# Patient Record
Sex: MALE | Race: OTHER | HISPANIC OR LATINO | Employment: FULL TIME | URBAN - METROPOLITAN AREA
[De-identification: names, ages, dates, MRNs, and addresses within clinical notes are randomized per-mention and may not be internally consistent; named-entity substitution may affect disease eponyms.]

---

## 2020-08-22 ENCOUNTER — APPOINTMENT (EMERGENCY)
Dept: CT IMAGING | Facility: HOSPITAL | Age: 60
End: 2020-08-22
Payer: OTHER MISCELLANEOUS

## 2020-08-22 ENCOUNTER — HOSPITAL ENCOUNTER (EMERGENCY)
Facility: HOSPITAL | Age: 60
Discharge: HOME | End: 2020-08-22
Attending: EMERGENCY MEDICINE | Admitting: EMERGENCY MEDICINE
Payer: OTHER MISCELLANEOUS

## 2020-08-22 ENCOUNTER — APPOINTMENT (EMERGENCY)
Dept: RADIOLOGY | Facility: HOSPITAL | Age: 60
End: 2020-08-22
Payer: OTHER MISCELLANEOUS

## 2020-08-22 VITALS
WEIGHT: 194 LBS | HEART RATE: 68 BPM | RESPIRATION RATE: 18 BRPM | SYSTOLIC BLOOD PRESSURE: 142 MMHG | DIASTOLIC BLOOD PRESSURE: 85 MMHG | OXYGEN SATURATION: 98 % | TEMPERATURE: 97.7 F

## 2020-08-22 DIAGNOSIS — S00.83XA CONTUSION OF FACE, INITIAL ENCOUNTER: ICD-10-CM
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
## 2020-08-22 DIAGNOSIS — S09.90XA INJURY OF HEAD, INITIAL ENCOUNTER: ICD-10-CM

## 2020-08-22 DIAGNOSIS — S33.5XXA SPRAIN OF LOW BACK, INITIAL ENCOUNTER: ICD-10-CM

## 2020-08-22 DIAGNOSIS — S80.10XA MULTIPLE LEG CONTUSIONS: ICD-10-CM

## 2020-08-22 LAB
ALBUMIN SERPL BCP-MCNC: 3.9 G/DL (ref 3–5.2)
ALP SERPL-CCNC: 76 U/L (ref 43–122)
ALT SERPL W P-5'-P-CCNC: 25 U/L (ref 9–52)
ANION GAP SERPL CALCULATED.3IONS-SCNC: 5 MMOL/L (ref 5–14)
AST SERPL W P-5'-P-CCNC: 23 U/L (ref 17–59)
BASOPHILS # BLD AUTO: 0 THOUSANDS/ΜL (ref 0–0.1)
BASOPHILS NFR BLD AUTO: 1 % (ref 0–1)
BILIRUB SERPL-MCNC: 0.6 MG/DL
BILIRUB UR QL STRIP: NEGATIVE
BUN SERPL-MCNC: 21 MG/DL (ref 5–25)
CALCIUM SERPL-MCNC: 8.9 MG/DL (ref 8.4–10.2)
CHLORIDE SERPL-SCNC: 110 MMOL/L (ref 97–108)
CLARITY UR: CLEAR
CO2 SERPL-SCNC: 26 MMOL/L (ref 22–30)
COLOR UR: NORMAL
CREAT SERPL-MCNC: 1.35 MG/DL (ref 0.7–1.5)
EOSINOPHIL # BLD AUTO: 0.1 THOUSAND/ΜL (ref 0–0.4)
EOSINOPHIL NFR BLD AUTO: 2 % (ref 0–6)
ERYTHROCYTE [DISTWIDTH] IN BLOOD BY AUTOMATED COUNT: 13.8 %
GFR SERPL CREATININE-BSD FRML MDRD: 58 ML/MIN/1.73SQ M
GLUCOSE SERPL-MCNC: 105 MG/DL (ref 70–99)
GLUCOSE UR STRIP-MCNC: NEGATIVE MG/DL
HCT VFR BLD AUTO: 42.8 % (ref 41–53)
HGB BLD-MCNC: 14.6 G/DL (ref 13.5–17.5)
HGB UR QL STRIP.AUTO: NEGATIVE
KETONES UR STRIP-MCNC: NEGATIVE MG/DL
LEUKOCYTE ESTERASE UR QL STRIP: NEGATIVE
LIPASE SERPL-CCNC: 82 U/L (ref 23–300)
LYMPHOCYTES # BLD AUTO: 1.6 THOUSANDS/ΜL (ref 0.5–4)
LYMPHOCYTES NFR BLD AUTO: 31 % (ref 25–45)
MCH RBC QN AUTO: 31.5 PG (ref 26–34)
MCHC RBC AUTO-ENTMCNC: 34 G/DL (ref 31–36)
MCV RBC AUTO: 93 FL (ref 80–100)
MONOCYTES # BLD AUTO: 0.3 THOUSAND/ΜL (ref 0.2–0.9)
MONOCYTES NFR BLD AUTO: 7 % (ref 1–10)
NEUTROPHILS # BLD AUTO: 3.1 THOUSANDS/ΜL (ref 1.8–7.8)
NEUTS SEG NFR BLD AUTO: 60 % (ref 45–65)
NITRITE UR QL STRIP: NEGATIVE
PH UR STRIP.AUTO: 7 [PH]
PLATELET # BLD AUTO: 162 THOUSANDS/UL (ref 150–450)
PMV BLD AUTO: 9.5 FL (ref 8.9–12.7)
POTASSIUM SERPL-SCNC: 4.4 MMOL/L (ref 3.6–5)
PROT SERPL-MCNC: 7.3 G/DL (ref 5.9–8.4)
PROT UR STRIP-MCNC: NEGATIVE MG/DL
RBC # BLD AUTO: 4.62 MILLION/UL (ref 4.5–5.9)
SODIUM SERPL-SCNC: 141 MMOL/L (ref 137–147)
SP GR UR STRIP.AUTO: 1.01 (ref 1–1.04)
UROBILINOGEN UA: NEGATIVE MG/DL
WBC # BLD AUTO: 5.2 THOUSAND/UL (ref 4.5–11)

## 2020-08-22 PROCEDURE — 72125 CT NECK SPINE W/O DYE: CPT

## 2020-08-22 PROCEDURE — 99284 EMERGENCY DEPT VISIT MOD MDM: CPT

## 2020-08-22 PROCEDURE — 96360 HYDRATION IV INFUSION INIT: CPT

## 2020-08-22 PROCEDURE — 70450 CT HEAD/BRAIN W/O DYE: CPT

## 2020-08-22 PROCEDURE — 96361 HYDRATE IV INFUSION ADD-ON: CPT

## 2020-08-22 PROCEDURE — 85025 COMPLETE CBC W/AUTO DIFF WBC: CPT | Performed by: PHYSICIAN ASSISTANT

## 2020-08-22 PROCEDURE — 73552 X-RAY EXAM OF FEMUR 2/>: CPT

## 2020-08-22 PROCEDURE — 36415 COLL VENOUS BLD VENIPUNCTURE: CPT | Performed by: PHYSICIAN ASSISTANT

## 2020-08-22 PROCEDURE — 72100 X-RAY EXAM L-S SPINE 2/3 VWS: CPT

## 2020-08-22 PROCEDURE — 81003 URINALYSIS AUTO W/O SCOPE: CPT | Performed by: PHYSICIAN ASSISTANT

## 2020-08-22 PROCEDURE — 83690 ASSAY OF LIPASE: CPT | Performed by: PHYSICIAN ASSISTANT

## 2020-08-22 PROCEDURE — 80053 COMPREHEN METABOLIC PANEL: CPT | Performed by: PHYSICIAN ASSISTANT

## 2020-08-22 PROCEDURE — 70486 CT MAXILLOFACIAL W/O DYE: CPT

## 2020-08-22 PROCEDURE — 99284 EMERGENCY DEPT VISIT MOD MDM: CPT | Performed by: PHYSICIAN ASSISTANT

## 2020-08-22 PROCEDURE — G1004 CDSM NDSC: HCPCS

## 2020-08-22 RX ORDER — ACETAMINOPHEN 325 MG/1
650 TABLET ORAL ONCE
Status: COMPLETED | OUTPATIENT
Start: 2020-08-22 | End: 2020-08-22

## 2020-08-22 RX ORDER — ACETAMINOPHEN 325 MG/1
650 TABLET ORAL EVERY 6 HOURS PRN
Qty: 30 TABLET | Refills: 0 | Status: SHIPPED | OUTPATIENT
Start: 2020-08-22 | End: 2021-01-27 | Stop reason: ALTCHOICE

## 2020-08-22 RX ORDER — METHOCARBAMOL 500 MG/1
500 TABLET, FILM COATED ORAL 2 TIMES DAILY
Qty: 20 TABLET | Refills: 0 | Status: SHIPPED | OUTPATIENT
Start: 2020-08-22 | End: 2020-12-03 | Stop reason: ALTCHOICE

## 2020-08-22 RX ORDER — SODIUM CHLORIDE 9 MG/ML
250 INJECTION, SOLUTION INTRAVENOUS CONTINUOUS
Status: DISCONTINUED | OUTPATIENT
Start: 2020-08-22 | End: 2020-08-22 | Stop reason: HOSPADM

## 2020-08-22 RX ADMIN — ACETAMINOPHEN 650 MG: 325 TABLET ORAL at 14:58

## 2020-08-22 RX ADMIN — SODIUM CHLORIDE 250 ML/HR: 0.9 INJECTION, SOLUTION INTRAVENOUS at 12:45

## 2020-08-22 NOTE — ED PROVIDER NOTES
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  Patient complains of left sided head pain, rib pain, and leg pain  Reports the pain worsened this AM         Medical Problem   Location:  Pt with slip and fall onto floor hitting left leg back and head     Severity:  Moderate  Onset quality:  Gradual  Duration:  5 days  Timing:  Constant  Progression:  Unchanged  Chronicity:  New  Associated symptoms: no abdominal pain, no chest pain, no congestion, no cough, no diarrhea, no ear pain, no fatigue, no fever, no headaches, no loss of consciousness, no myalgias, no nausea, no rash, no rhinorrhea, no shortness of breath, no sore throat, no vomiting and no wheezing        None       History reviewed  No pertinent past medical history  History reviewed  No pertinent surgical history  History reviewed  No pertinent family history  I have reviewed and agree with the history as documented  E-Cigarette/Vaping     E-Cigarette/Vaping Substances     Social History     Tobacco Use    Smoking status: Never Smoker    Smokeless tobacco: Never Used   Substance Use Topics    Alcohol use: Not Currently    Drug use: Not Currently       Review of Systems   Constitutional: Negative  Negative for fatigue and fever  HENT: Negative  Negative for congestion, ear pain, rhinorrhea and sore throat  Eyes: Negative  Respiratory: Negative  Negative for cough, shortness of breath and wheezing  Cardiovascular: Negative  Negative for chest pain  Gastrointestinal: Negative  Negative for abdominal pain, diarrhea, nausea and vomiting  Endocrine: Negative  Genitourinary: Negative  Musculoskeletal: Negative  Negative for myalgias  Skin: Negative  Negative for rash  Allergic/Immunologic: Negative  Neurological: Negative  Negative for loss of consciousness and headaches  Hematological: Negative  Psychiatric/Behavioral: Negative      All other systems reviewed and are negative  Physical Exam  Physical Exam  Vitals signs and nursing note reviewed  Constitutional:       Appearance: Normal appearance  He is normal weight  HENT:      Head: Normocephalic and atraumatic  Comments: Left periorbiatl tendern and swelling and ecchymosis  Left jaw pain no swelling no dental pain        Right Ear: Tympanic membrane, ear canal and external ear normal       Left Ear: Tympanic membrane, ear canal and external ear normal       Nose: Nose normal       Mouth/Throat:      Mouth: Mucous membranes are moist       Pharynx: Oropharynx is clear  Eyes:      Extraocular Movements: Extraocular movements intact  Conjunctiva/sclera: Conjunctivae normal       Pupils: Pupils are equal, round, and reactive to light  Neck:      Musculoskeletal: Normal range of motion and neck supple  Comments: Midline and right paraspinal tenderness   Cardiovascular:      Rate and Rhythm: Normal rate  Pulses: Normal pulses  Pulmonary:      Effort: Pulmonary effort is normal       Breath sounds: Normal breath sounds  Abdominal:      General: Abdomen is flat  Bowel sounds are normal       Palpations: Abdomen is soft  Musculoskeletal:      Comments: Lumbar tenderness  No rib pain   Left distal thigh pain  No swelling no ecchymosis distal neuro and vascular wnl    No sciatic notch pain dtr wnl great toe ext strong bilat no paresthesias    Skin:     General: Skin is warm  Capillary Refill: Capillary refill takes less than 2 seconds  Neurological:      General: No focal deficit present  Mental Status: He is alert and oriented to person, place, and time     Psychiatric:         Mood and Affect: Mood normal          Behavior: Behavior normal          Vital Signs  ED Triage Vitals [08/22/20 1145]   Temperature Pulse Respirations Blood Pressure SpO2   97 7 °F (36 5 °C) 96 16 156/96 97 %      Temp Source Heart Rate Source Patient Position - Orthostatic VS BP Location FiO2 (%)   Tympanic Monitor Sitting Left arm --      Pain Score       --           Vitals:    08/22/20 1145 08/22/20 1442   BP: 156/96 142/85   Pulse: 96 68   Patient Position - Orthostatic VS: Sitting Sitting         Visual Acuity      ED Medications  Medications   acetaminophen (TYLENOL) tablet 650 mg (650 mg Oral Given 8/22/20 2578)       Diagnostic Studies  Results Reviewed     Procedure Component Value Units Date/Time    UA w Reflex to Microscopic w Reflex to Culture [[id number]]  (Normal) Collected:  08/22/20 1402    Lab Status:  Final result Specimen:  Urine, Clean Catch Updated:  08/22/20 1410     Color, UA Straw     Clarity, UA Clear     Specific Gravity, UA 1 015     pH, UA 7 0     Leukocytes, UA Negative     Nitrite, UA Negative     Protein, UA Negative mg/dl      Glucose, UA Negative mg/dl      Ketones, UA Negative mg/dl      Bilirubin, UA Negative     Blood, UA Negative     UROBILINOGEN UA Negative mg/dL     Lipase [[id number]]  (Normal) Collected:  08/22/20 1210    Lab Status:  Final result Specimen:  Blood from Arm, Right Updated:  08/22/20 1226     Lipase 82 u/L     Comprehensive metabolic panel [[id number]]  (Abnormal) Collected:  08/22/20 1210    Lab Status:  Final result Specimen:  Blood from Arm, Right Updated:  08/22/20 1226     Sodium 141 mmol/L      Potassium 4 4 mmol/L      Chloride 110 mmol/L      CO2 26 mmol/L      ANION GAP 5 mmol/L      BUN 21 mg/dL      Creatinine 1 35 mg/dL      Glucose 105 mg/dL      Calcium 8 9 mg/dL      AST 23 U/L      ALT 25 U/L      Alkaline Phosphatase 76 U/L      Total Protein 7 3 g/dL      Albumin 3 9 g/dL      Total Bilirubin 0 60 mg/dL      eGFR 58 ml/min/1 73sq m     Narrative:       [place] guidelines for Chronic Kidney Disease (CKD):     Stage 1 with normal or high GFR (GFR > 90 mL/min/1 73 square meters)    Stage 2 Mild CKD (GFR = 60-89 mL/min/1 73 square meters)    Stage 3A Moderate CKD (GFR = 45-59 mL/min/1 73 square meters)    Stage 3B Moderate CKD (GFR = 30-44 mL/min/1 73 square meters)    Stage 4 Severe CKD (GFR = 15-29 mL/min/1 73 square meters)    Stage 5 End Stage CKD (GFR <15 mL/min/1 73 square meters)  Note: GFR calculation is accurate only with a steady state creatinine    CBC and differential [[id number]]  (Normal) Collected:  08/22/20 1210    Lab Status:  Final result Specimen:  Blood from Arm, Right Updated:  08/22/20 1220     WBC 5 20 Thousand/uL      RBC 4 62 Million/uL      Hemoglobin 14 6 g/dL      Hematocrit 42 8 %      MCV 93 fL      MCH 31 5 pg      MCHC 34 0 g/dL      RDW 13 8 %      MPV 9 5 fL      Platelets 261 Thousands/uL      Neutrophils Relative 60 %      Lymphocytes Relative 31 %      Monocytes Relative 7 %      Eosinophils Relative 2 %      Basophils Relative 1 %      Neutrophils Absolute 3 10 Thousands/µL      Lymphocytes Absolute 1 60 Thousands/µL      Monocytes Absolute 0 30 Thousand/µL      Eosinophils Absolute 0 10 Thousand/µL      Basophils Absolute 0 00 Thousands/µL                  XR femur 2 views LEFT   Final Result by Jayson Coker MD (08/22 1336)      No acute osseous abnormality  Workstation performed: [id number]         XR spine lumbar 2 or 3 views injury   Final Result by Jayson Coker MD (08/22 1336)      No acute abnormality  Workstation performed: [id number]         CT head without contrast   Final Result by Sue Dove MD (08/22 1234)      No acute intracranial abnormality  Workstation performed: [id number]         CT facial bones without contrast   Final Result by Sue Dove MD (08/22 1237)      Normal noncontrast CT of the facial bones  Workstation performed: [id number]         CT spine cervical without contrast   Final Result by Sue Dove MD (08/22 1241)      No cervical spine fracture or traumatic malalignment  Diffuse osteopenia                     Workstation performed: [id number] Procedures  Procedures         ED Course                                             MDM      Disposition  Final diagnoses:   Fall, initial encounter   Injury of head, initial encounter   Contusion of face, initial encounter   Sprain of low back, initial encounter   Multiple leg contusions     Time reflects when diagnosis was documented in both MDM as applicable and the Disposition within this note     Time User Action Codes Description Comment    8/22/2020  2:32 PM [place]  Add [W19  AJMW] Fall, initial encounter     8/22/2020  2:32 PM [place]  Add [S09 90XA] Injury of head, initial encounter     8/22/2020  2:33 PM [place]  Add [S00 83XA] Contusion of face, initial encounter     8/22/2020  2:33 PM [place]  Add [S33  5XXA] Sprain of low back, initial encounter     8/22/2020  2:33 PM [place]  Add [S80 10XA] Multiple leg contusions       ED Disposition     ED Disposition Condition Date/Time Comment    Discharge Stable Sat Aug 22, 2020  2:32 PM Rianna Vela discharge to home/self care  Follow-up Information     Follow up With Specialties Details Why [address] Jaelyn Ware MD Family Medicine   [address] [place] [address]  [phone]            Discharge Medication List as of 8/22/2020  2:35 PM      START taking these medications    Details   acetaminophen (TYLENOL) 325 mg tablet Take 2 tablets (650 mg total) by mouth every 6 (six) hours as needed for mild pain, Starting Sat 8/22/2020, Print      methocarbamol (ROBAXIN) 500 mg tablet Take 1 tablet (500 mg total) by mouth 2 (two) times a day, Starting Sat 8/22/2020, Print           No discharge procedures on file      PDMP Review     None          ED Provider  Electronically Signed by           Luis Riojas PA-C  08/23/20 9656

## 2020-09-22 ENCOUNTER — HOSPITAL ENCOUNTER (EMERGENCY)
Facility: HOSPITAL | Age: 60
Discharge: HOME | End: 2020-09-22
Attending: EMERGENCY MEDICINE | Admitting: EMERGENCY MEDICINE
Payer: COMMERCIAL

## 2020-09-22 VITALS
WEIGHT: 191.8 LBS | RESPIRATION RATE: 16 BRPM | OXYGEN SATURATION: 99 % | TEMPERATURE: 97.7 F | SYSTOLIC BLOOD PRESSURE: 153 MMHG | HEART RATE: 79 BPM | DIASTOLIC BLOOD PRESSURE: 113 MMHG

## 2020-09-22 DIAGNOSIS — Z20.822 SUSPECTED COVID-19 VIRUS INFECTION: Primary | ICD-10-CM

## 2020-09-22 DIAGNOSIS — J02.9 PHARYNGITIS: ICD-10-CM

## 2020-09-22 PROCEDURE — 99283 EMERGENCY DEPT VISIT LOW MDM: CPT

## 2020-09-22 PROCEDURE — 99284 EMERGENCY DEPT VISIT MOD MDM: CPT | Performed by: PHYSICIAN ASSISTANT

## 2020-09-22 PROCEDURE — U0003 INFECTIOUS AGENT DETECTION BY NUCLEIC ACID (DNA OR RNA); SEVERE ACUTE RESPIRATORY SYNDROME CORONAVIRUS 2 (SARS-COV-2) (CORONAVIRUS DISEASE [COVID-19]), AMPLIFIED PROBE TECHNIQUE, MAKING USE OF HIGH THROUGHPUT TECHNOLOGIES AS DESCRIBED BY CMS-2020-01-R: HCPCS | Performed by: PHYSICIAN ASSISTANT

## 2020-09-22 RX ORDER — NAPROXEN 500 MG/1
500 TABLET ORAL 2 TIMES DAILY WITH MEALS
Qty: 20 TABLET | Refills: 0 | Status: SHIPPED | OUTPATIENT
Start: 2020-09-22 | End: 2020-12-03 | Stop reason: ALTCHOICE

## 2020-09-22 NOTE — ED PROVIDER NOTES
History  Chief Complaint   Patient presents with    Sore Throat     fever and sore throat since Saturday     Patient is a 51-year-old male presents today for evaluation of a sore throat patient reports he was told at work and high temperature and was sent for evaluation and cover testing  Patient denies any coughing, chest pain, shortness of breath, abdominal pain, nausea, vomiting, diarrhea  Patient reports he is not aware of any COVID-19 positive contacts however is reported that he needs testing  Patient reports the throat is sore in quality, does not radiate, is mild in severity patient denies taking any medications to alleviate his symptoms  Patient denies any difficulty breathing, difficulty managing oral secretions patient denies any neck stiffness or difficulty turning his head or neck  Prior to Admission Medications   Prescriptions Last Dose Informant Patient Reported? Taking?   acetaminophen (TYLENOL) 325 mg tablet   No No   Sig: Take 2 tablets (650 mg total) by mouth every 6 (six) hours as needed for mild pain   methocarbamol (ROBAXIN) 500 mg tablet   No No   Sig: Take 1 tablet (500 mg total) by mouth 2 (two) times a day      Facility-Administered Medications: None       History reviewed  No pertinent past medical history  Past Surgical History:   Procedure Laterality Date    HERNIA REPAIR         History reviewed  No pertinent family history  I have reviewed and agree with the history as documented  E-Cigarette/Vaping    E-Cigarette Use Never User      E-Cigarette/Vaping Substances     Social History     Tobacco Use    Smoking status: Never Smoker    Smokeless tobacco: Never Used   Substance Use Topics    Alcohol use: Not Currently    Drug use: Not Currently       Review of Systems   Constitutional: Negative for chills, fatigue and fever  HENT: Positive for sore throat  Negative for congestion, ear pain and rhinorrhea  Eyes: Negative for redness     Respiratory: Negative for chest tightness and shortness of breath  Cardiovascular: Negative for chest pain and palpitations  Gastrointestinal: Negative for abdominal pain, nausea and vomiting  Genitourinary: Negative for dysuria and hematuria  Musculoskeletal: Negative  Skin: Negative for rash  Neurological: Negative for dizziness, syncope, light-headedness and numbness  Physical Exam  Physical Exam  Vitals signs and nursing note reviewed  Constitutional:       Appearance: He is well-developed  HENT:      Head: Normocephalic and atraumatic  Mouth/Throat:      Pharynx: Uvula midline  No pharyngeal swelling or uvula swelling  Tonsils: No tonsillar exudate or tonsillar abscesses  Eyes:      Pupils: Pupils are equal, round, and reactive to light  Neck:      Musculoskeletal: Normal range of motion  Cardiovascular:      Rate and Rhythm: Normal rate and regular rhythm  Heart sounds: Normal heart sounds  Pulmonary:      Effort: Pulmonary effort is normal       Breath sounds: Normal breath sounds  Abdominal:      Palpations: Abdomen is soft  Tenderness: There is no abdominal tenderness  There is no guarding  Lymphadenopathy:      Cervical: No cervical adenopathy  Skin:     General: Skin is warm and dry  Capillary Refill: Capillary refill takes less than 2 seconds  Neurological:      Mental Status: He is alert and oriented to person, place, and time           Vital Signs  ED Triage Vitals [09/22/20 1156]   Temperature Pulse Respirations Blood Pressure SpO2   97 7 °F (36 5 °C) 79 16 (!) 153/113 99 %      Temp Source Heart Rate Source Patient Position - Orthostatic VS BP Location FiO2 (%)   Tympanic Monitor Sitting Left arm --      Pain Score       2           Vitals:    09/22/20 1156   BP: (!) 153/113   Pulse: 79   Patient Position - Orthostatic VS: Sitting         Visual Acuity      ED Medications  Medications - No data to display    Diagnostic Studies  Results Reviewed     Procedure Component Value Units Date/Time    Novel Coronavirus (COVID-19), PCR [facility] [[id number]] Collected:  09/22/20 1215    Lab Status:  No result Specimen:  Nares from Nose                  No orders to display              Procedures  Procedures         ED Course                           SBIRT 22yo+      Most Recent Value   SBIRT (25 yo +)   In order to provide better care to our patients, we are screening all of our patients for alcohol and drug use  Would it be okay to ask you these screening questions? Yes Filed at: 09/22/2020 1201   Initial Alcohol Screen: US AUDIT-C    1  How often do you have a drink containing alcohol?  0 Filed at: 09/22/2020 1201   2  How many drinks containing alcohol do you have on a typical day you are drinking? 0 Filed at: 09/22/2020 1201   3a  Male UNDER 65: How often do you have five or more drinks on one occasion? 0 Filed at: 09/22/2020 1201   3b  FEMALE Any Age, or MALE 65+: How often do you have 4 or more drinks on one occassion? 0 Filed at: 09/22/2020 1201   Audit-C Score  0 Filed at: 09/22/2020 1201   IGOR: How many times in the past year have you    Used an illegal drug or used a prescription medication for non-medical reasons? Never Filed at: 09/22/2020 1201                  MDM  Number of Diagnoses or Management Options  Pharyngitis:   Suspected Covid-19 Virus Infection:   Diagnosis management comments: Strict return to ED precautions discussed  Patient recommended to follow up promptly with appropriate outpatient provider  Patient and/or family members verbalizes understanding and agrees with plan  Patient is stable for discharge      Portions of the record may have been created with voice recognition software  Occasional wrong word or "sound a like" substitutions may have occurred due to the inherent limitations of voice recognition software  Read the chart carefully and recognize, using context, where substitutions have occurred          Disposition  Final diagnoses: Suspected Covid-19 Virus Infection   Pharyngitis     Time reflects when diagnosis was documented in both MDM as applicable and the Disposition within this note     Time User Action Codes Description Comment    9/22/2020 12:09 PM Vilma Gr [Z20 [id number]] Suspected Covid-19 Virus Infection     9/22/2020 12:10 PM Sharda Zaman - Margaret Maria Ines Saldivar [J02 [id number]] Pharyngitis       ED Disposition     ED Disposition Condition Date/Time Comment    Discharge Good Tue Sep 22, 2020 12:09 PM Rianna Vela discharge to home/self care  Follow-up Information     Follow up With Specialties Details Why Contact Info    Georgi Lewis MD Family Medicine Schedule an appointment as soon as possible for a visit   [address] [place] [address]  [phone]            Discharge Medication List as of 9/22/2020 12:11 PM      START taking these medications    Details   menthol-cetylpyridinium (CEPACOL) 3 MG lozenge Take 1 lozenge (3 mg total) by mouth as needed for sore throat, Starting Tue 9/22/2020, Normal      naproxen (EC NAPROSYN) 500 MG EC tablet Take 1 tablet (500 mg total) by mouth 2 (two) times a day with meals, Starting Tue 9/22/2020, Until Wed 9/22/2021, Normal         CONTINUE these medications which have NOT CHANGED    Details   acetaminophen (TYLENOL) 325 mg tablet Take 2 tablets (650 mg total) by mouth every 6 (six) hours as needed for mild pain, Starting Sat 8/22/2020, Print      methocarbamol (ROBAXIN) 500 mg tablet Take 1 tablet (500 mg total) by mouth 2 (two) times a day, Starting Sat 8/22/2020, Print           No discharge procedures on file      PDMP Review     None          ED Provider  Electronically Signed by           Belvin Hashimoto, PA-C  09/22/20 1794

## 2020-09-23 LAB — SARS-COV-2 RNA SPEC QL NAA+PROBE: DETECTED

## 2020-09-26 NOTE — ED NOTES
Provider Citlalli Llanos PA-C informed of patient's positive Covid result  (09/26/2020 @1217)       [place]  09/26/20 1218

## 2020-12-03 ENCOUNTER — OFFICE VISIT (OUTPATIENT)
Dept: FAMILY MEDICINE CLINIC | Facility: CLINIC | Age: 60
End: 2020-12-03
Payer: COMMERCIAL

## 2020-12-03 VITALS
DIASTOLIC BLOOD PRESSURE: 110 MMHG | TEMPERATURE: 98.1 F | RESPIRATION RATE: 18 BRPM | HEIGHT: 68 IN | OXYGEN SATURATION: 98 % | SYSTOLIC BLOOD PRESSURE: 150 MMHG | HEART RATE: 72 BPM | WEIGHT: 196 LBS | BODY MASS INDEX: 29.7 KG/M2

## 2020-12-03 DIAGNOSIS — R53.83 OTHER FATIGUE: ICD-10-CM

## 2020-12-03 DIAGNOSIS — Z12.11 ENCOUNTER FOR SCREENING COLONOSCOPY: ICD-10-CM

## 2020-12-03 DIAGNOSIS — I10 ESSENTIAL HYPERTENSION: Primary | ICD-10-CM

## 2020-12-03 DIAGNOSIS — R73.9 HYPERGLYCEMIA: ICD-10-CM

## 2020-12-03 DIAGNOSIS — Z23 NEEDS FLU SHOT: ICD-10-CM

## 2020-12-03 DIAGNOSIS — E78.2 MIXED HYPERLIPIDEMIA: ICD-10-CM

## 2020-12-03 PROCEDURE — 90471 IMMUNIZATION ADMIN: CPT

## 2020-12-03 PROCEDURE — 99204 OFFICE O/P NEW MOD 45 MIN: CPT | Performed by: NURSE PRACTITIONER

## 2020-12-03 PROCEDURE — 90682 RIV4 VACC RECOMBINANT DNA IM: CPT

## 2020-12-03 RX ORDER — LISINOPRIL AND HYDROCHLOROTHIAZIDE 20; 12.5 MG/1; MG/1
1 TABLET ORAL DAILY
Qty: 30 TABLET | Refills: 5 | Status: SHIPPED | OUTPATIENT
Start: 2020-12-03 | End: 2021-01-27 | Stop reason: SDUPTHER

## 2020-12-22 ENCOUNTER — TELEPHONE (OUTPATIENT)
Dept: FAMILY MEDICINE CLINIC | Facility: CLINIC | Age: 60
End: 2020-12-22

## 2021-01-14 ENCOUNTER — LAB (OUTPATIENT)
Dept: LAB | Facility: HOSPITAL | Age: 61
End: 2021-01-14
Payer: COMMERCIAL

## 2021-01-14 DIAGNOSIS — E78.2 MIXED HYPERLIPIDEMIA: ICD-10-CM

## 2021-01-14 DIAGNOSIS — R73.9 HYPERGLYCEMIA: ICD-10-CM

## 2021-01-14 DIAGNOSIS — R53.83 OTHER FATIGUE: ICD-10-CM

## 2021-01-14 LAB
ALBUMIN SERPL BCP-MCNC: 4.4 G/DL (ref 3–5.2)
ALP SERPL-CCNC: 79 U/L (ref 43–122)
ALT SERPL W P-5'-P-CCNC: 20 U/L (ref 9–52)
ANION GAP SERPL CALCULATED.3IONS-SCNC: 7 MMOL/L (ref 5–14)
AST SERPL W P-5'-P-CCNC: 29 U/L (ref 17–59)
BASOPHILS # BLD AUTO: 0 THOUSANDS/ΜL (ref 0–0.1)
BASOPHILS NFR BLD AUTO: 1 % (ref 0–1)
BILIRUB SERPL-MCNC: 1.4 MG/DL
BUN SERPL-MCNC: 17 MG/DL (ref 5–25)
CALCIUM SERPL-MCNC: 9.4 MG/DL (ref 8.4–10.2)
CHLORIDE SERPL-SCNC: 105 MMOL/L (ref 97–108)
CHOLEST SERPL-MCNC: 170 MG/DL
CO2 SERPL-SCNC: 29 MMOL/L (ref 22–30)
CREAT SERPL-MCNC: 1.48 MG/DL (ref 0.7–1.5)
EOSINOPHIL # BLD AUTO: 0.2 THOUSAND/ΜL (ref 0–0.4)
EOSINOPHIL NFR BLD AUTO: 3 % (ref 0–6)
ERYTHROCYTE [DISTWIDTH] IN BLOOD BY AUTOMATED COUNT: 14.3 %
EST. AVERAGE GLUCOSE BLD GHB EST-MCNC: 117 MG/DL
GFR SERPL CREATININE-BSD FRML MDRD: 51 ML/MIN/1.73SQ M
GLUCOSE P FAST SERPL-MCNC: 96 MG/DL (ref 70–99)
HBA1C MFR BLD: 5.7 %
HCT VFR BLD AUTO: 44.6 % (ref 41–53)
HDLC SERPL-MCNC: 27 MG/DL
HGB BLD-MCNC: 14.6 G/DL (ref 13.5–17.5)
LDLC SERPL CALC-MCNC: 114 MG/DL
LYMPHOCYTES # BLD AUTO: 2.1 THOUSANDS/ΜL (ref 0.5–4)
LYMPHOCYTES NFR BLD AUTO: 37 % (ref 25–45)
MCH RBC QN AUTO: 30.6 PG (ref 26–34)
MCHC RBC AUTO-ENTMCNC: 32.7 G/DL (ref 31–36)
MCV RBC AUTO: 94 FL (ref 80–100)
MONOCYTES # BLD AUTO: 0.5 THOUSAND/ΜL (ref 0.2–0.9)
MONOCYTES NFR BLD AUTO: 8 % (ref 1–10)
NEUTROPHILS # BLD AUTO: 3 THOUSANDS/ΜL (ref 1.8–7.8)
NEUTS SEG NFR BLD AUTO: 52 % (ref 45–65)
NONHDLC SERPL-MCNC: 143 MG/DL
PLATELET # BLD AUTO: 181 THOUSANDS/UL (ref 150–450)
PMV BLD AUTO: 9.9 FL (ref 8.9–12.7)
POTASSIUM SERPL-SCNC: 4 MMOL/L (ref 3.6–5)
PROT SERPL-MCNC: 8 G/DL (ref 5.9–8.4)
RBC # BLD AUTO: 4.76 MILLION/UL (ref 4.5–5.9)
SODIUM SERPL-SCNC: 141 MMOL/L (ref 137–147)
TRIGL SERPL-MCNC: 147 MG/DL
TSH SERPL DL<=0.05 MIU/L-ACNC: 1.19 UIU/ML (ref 0.47–4.68)
WBC # BLD AUTO: 5.8 THOUSAND/UL (ref 4.5–11)

## 2021-01-14 PROCEDURE — 80053 COMPREHEN METABOLIC PANEL: CPT

## 2021-01-14 PROCEDURE — 83036 HEMOGLOBIN GLYCOSYLATED A1C: CPT

## 2021-01-14 PROCEDURE — 36415 COLL VENOUS BLD VENIPUNCTURE: CPT

## 2021-01-14 PROCEDURE — 84443 ASSAY THYROID STIM HORMONE: CPT

## 2021-01-14 PROCEDURE — 80061 LIPID PANEL: CPT

## 2021-01-14 PROCEDURE — 85025 COMPLETE CBC W/AUTO DIFF WBC: CPT

## 2021-01-15 ENCOUNTER — TELEPHONE (OUTPATIENT)
Dept: FAMILY MEDICINE CLINIC | Facility: CLINIC | Age: 61
End: 2021-01-15

## 2021-01-15 NOTE — TELEPHONE ENCOUNTER
Call LVM need to call office back for results  ----- Message from Esme Goncalves sent at 1/15/2021  3:46 PM EST -----  Please call pt and inform that labs were normal except that he is pre-diabetes and not yet diabetic but he should eat foods higher in fiber such as vegetables, fruits, whole grains and foods rich in omega-3 fatty acids such as fatty fish, nuts, and seeds but avoid consumption of sugar-sweetened beverages  Diet and exercise with weight loss as well     Explain to patient that we would monitor in 6 mo their A1c again and if normal will monitor yearly  To follow up in 6 months

## 2021-01-27 ENCOUNTER — OFFICE VISIT (OUTPATIENT)
Dept: FAMILY MEDICINE CLINIC | Facility: CLINIC | Age: 61
End: 2021-01-27
Payer: COMMERCIAL

## 2021-01-27 VITALS
HEART RATE: 78 BPM | DIASTOLIC BLOOD PRESSURE: 76 MMHG | HEIGHT: 68 IN | SYSTOLIC BLOOD PRESSURE: 120 MMHG | OXYGEN SATURATION: 98 % | WEIGHT: 192 LBS | BODY MASS INDEX: 29.1 KG/M2 | RESPIRATION RATE: 16 BRPM | TEMPERATURE: 98 F

## 2021-01-27 DIAGNOSIS — Z12.5 SCREENING FOR PROSTATE CANCER: ICD-10-CM

## 2021-01-27 DIAGNOSIS — N18.31 STAGE 3A CHRONIC KIDNEY DISEASE (HCC): ICD-10-CM

## 2021-01-27 DIAGNOSIS — N52.9 ERECTILE DYSFUNCTION, UNSPECIFIED ERECTILE DYSFUNCTION TYPE: ICD-10-CM

## 2021-01-27 DIAGNOSIS — I10 ESSENTIAL HYPERTENSION: ICD-10-CM

## 2021-01-27 DIAGNOSIS — Z11.4 SCREENING FOR HUMAN IMMUNODEFICIENCY VIRUS: ICD-10-CM

## 2021-01-27 DIAGNOSIS — K40.90 DIRECT INGUINAL HERNIA: ICD-10-CM

## 2021-01-27 DIAGNOSIS — Z12.11 COLON CANCER SCREENING: ICD-10-CM

## 2021-01-27 DIAGNOSIS — Z11.59 NEED FOR HEPATITIS C SCREENING TEST: ICD-10-CM

## 2021-01-27 DIAGNOSIS — Z00.01 ENCOUNTER FOR GENERAL ADULT MEDICAL EXAMINATION WITH ABNORMAL FINDINGS: Primary | ICD-10-CM

## 2021-01-27 PROCEDURE — 99396 PREV VISIT EST AGE 40-64: CPT | Performed by: FAMILY MEDICINE

## 2021-01-27 RX ORDER — SILDENAFIL CITRATE 20 MG/1
40 TABLET ORAL DAILY
Qty: 60 TABLET | Refills: 3 | Status: SHIPPED | OUTPATIENT
Start: 2021-01-27 | End: 2022-02-11

## 2021-01-27 RX ORDER — ATORVASTATIN CALCIUM 20 MG/1
20 TABLET, FILM COATED ORAL DAILY
Qty: 90 TABLET | Refills: 3 | Status: SHIPPED | OUTPATIENT
Start: 2021-01-27 | End: 2021-05-04 | Stop reason: SDUPTHER

## 2021-01-27 RX ORDER — LISINOPRIL AND HYDROCHLOROTHIAZIDE 20; 12.5 MG/1; MG/1
1 TABLET ORAL DAILY
Qty: 90 TABLET | Refills: 3 | Status: SHIPPED | OUTPATIENT
Start: 2021-01-27 | End: 2021-04-20

## 2021-01-27 NOTE — PROGRESS NOTES
Assessment/Plan:    No problem-specific Assessment & Plan notes found for this encounter  Diagnoses and all orders for this visit:    Encounter for general adult medical examination with abnormal findings    Need for hepatitis C screening test  -     Hepatitis C antibody; Future    Screening for human immunodeficiency virus  -     HIV 1/2 Antigen/Antibody (4th Generation) w Reflex [facility]; Future    Essential hypertension  -     lisinopril-hydrochlorothiazide (PRINZIDE,ZESTORETIC) 20-12 5 MG per tablet; Take 1 tablet by mouth daily  -     atorvastatin (LIPITOR) 20 mg tablet; Take 1 tablet (20 mg total) by mouth daily    Erectile dysfunction, unspecified erectile dysfunction type  -     sildenafil (REVATIO) 20 mg tablet; Take 2 tablets (40 mg total) by mouth daily    Stage 3a chronic kidney disease  -     US kidney and bladder; Future  -     Urinalysis with microscopic    Screening for prostate cancer  -     PSA, total and free; Future    Direct inguinal hernia    Colon cancer screening  -     Cologuard; Future          Subjective:      Patient ID: Amaya Ho is a 64 y o  male  HPI  Patient is here for PE, not having any acute distress  New patient  Patient reports 'urine infection in the past"  Seen in the past by Dr Corona Foil  He did not follow with her due to misscommunication with her  #years ago he found himself at the ER after sore throat then hematuria and high blood pressure  He feels good and believes he has no more issues with his kidneys  The following portions of the patient's history were reviewed and updated as appropriate: allergies, current medications, past family history, past medical history, past social history, past surgical history and problem list     Review of Systems   Constitutional: Negative for diaphoresis, fatigue, fever and unexpected weight change  Respiratory: Negative for apnea, cough, choking, chest tightness and shortness of breath      Cardiovascular: Negative for chest pain, palpitations and leg swelling  Gastrointestinal: Negative for abdominal distention, abdominal pain, anal bleeding, blood in stool and constipation  Musculoskeletal: Negative for arthralgias, back pain, gait problem and joint swelling  Neurological: Negative for dizziness, facial asymmetry, light-headedness and headaches  Psychiatric/Behavioral: Negative for behavioral problems, dysphoric mood and self-injury  The patient is not nervous/anxious  Objective:      /76 (BP Location: Left arm, Patient Position: Sitting, Cuff Size: Standard)   Pulse 78   Temp 98 °F (36 7 °C) (Temporal)   Resp 16   Ht 5' 8" (1 727 m)   Wt 87 1 kg (192 lb)   SpO2 98%   BMI 29 19 kg/m²          Physical Exam  Vitals signs and nursing note reviewed  Neck:      Musculoskeletal: No edema or neck rigidity  Thyroid: No thyroid mass or thyromegaly  Vascular: No carotid bruit or JVD  Trachea: No tracheal tenderness  Cardiovascular:      Rate and Rhythm: Normal rate and regular rhythm  No extrasystoles are present  Pulses: Normal pulses  Heart sounds: Normal heart sounds  Heart sounds not distant  No friction rub  Pulmonary:      Effort: Pulmonary effort is normal  No tachypnea or bradypnea  Breath sounds: Normal breath sounds  No stridor  Abdominal:      General: Bowel sounds are normal  There is no abdominal bruit  Palpations: Abdomen is soft  There is no hepatomegaly or splenomegaly  Hernia: No hernia is present  Musculoskeletal: Normal range of motion  Skin:     General: Skin is warm and dry  Neurological:      Mental Status: He is oriented to person, place, and time  Deep Tendon Reflexes: Reflexes are normal and symmetric  Psychiatric:         Behavior: Behavior normal          Thought Content:  Thought content normal          Judgment: Judgment normal

## 2021-01-30 ENCOUNTER — LAB (OUTPATIENT)
Dept: LAB | Facility: HOSPITAL | Age: 61
End: 2021-01-30
Payer: COMMERCIAL

## 2021-01-30 ENCOUNTER — HOSPITAL ENCOUNTER (OUTPATIENT)
Dept: ULTRASOUND IMAGING | Facility: HOSPITAL | Age: 61
Discharge: HOME | End: 2021-01-30
Payer: COMMERCIAL

## 2021-01-30 DIAGNOSIS — N18.31 STAGE 3A CHRONIC KIDNEY DISEASE (HCC): ICD-10-CM

## 2021-01-30 DIAGNOSIS — Z11.4 SCREENING FOR HUMAN IMMUNODEFICIENCY VIRUS: ICD-10-CM

## 2021-01-30 DIAGNOSIS — Z11.59 NEED FOR HEPATITIS C SCREENING TEST: ICD-10-CM

## 2021-01-30 DIAGNOSIS — Z12.5 SCREENING FOR PROSTATE CANCER: ICD-10-CM

## 2021-01-30 LAB
BACTERIA UR QL AUTO: ABNORMAL /HPF
BILIRUB UR QL STRIP: NEGATIVE
CLARITY UR: CLEAR
COLOR UR: ABNORMAL
GLUCOSE UR STRIP-MCNC: NEGATIVE MG/DL
HGB UR QL STRIP.AUTO: NEGATIVE
KETONES UR STRIP-MCNC: NEGATIVE MG/DL
LEUKOCYTE ESTERASE UR QL STRIP: NEGATIVE
MUCOUS THREADS UR QL AUTO: ABNORMAL
NITRITE UR QL STRIP: NEGATIVE
NON-SQ EPI CELLS URNS QL MICRO: ABNORMAL /HPF
PH UR STRIP.AUTO: 6 [PH]
PROT UR STRIP-MCNC: NEGATIVE MG/DL
RBC #/AREA URNS AUTO: ABNORMAL /HPF
SP GR UR STRIP.AUTO: 1.01 (ref 1–1.04)
UROBILINOGEN UA: NEGATIVE MG/DL
WBC #/AREA URNS AUTO: ABNORMAL /HPF

## 2021-01-30 PROCEDURE — 36415 COLL VENOUS BLD VENIPUNCTURE: CPT

## 2021-01-30 PROCEDURE — 86803 HEPATITIS C AB TEST: CPT

## 2021-01-30 PROCEDURE — 76770 US EXAM ABDO BACK WALL COMP: CPT

## 2021-01-30 PROCEDURE — 84154 ASSAY OF PSA FREE: CPT

## 2021-01-30 PROCEDURE — 87389 HIV-1 AG W/HIV-1&-2 AB AG IA: CPT

## 2021-01-30 PROCEDURE — 84153 ASSAY OF PSA TOTAL: CPT

## 2021-01-30 PROCEDURE — 81001 URINALYSIS AUTO W/SCOPE: CPT | Performed by: FAMILY MEDICINE

## 2021-01-31 LAB — HCV AB SER QL: NORMAL

## 2021-02-02 LAB — HIV 1+2 AB+HIV1 P24 AG SERPL QL IA: NORMAL

## 2021-02-03 LAB
PSA FREE MFR SERPL: 26.7 %
PSA FREE SERPL-MCNC: 0.56 NG/ML
PSA SERPL-MCNC: 2.1 NG/ML (ref 0–4)

## 2021-02-08 NOTE — RESULT ENCOUNTER NOTE
Please call patient's labs are in normal limits  The ultrasound of the kidneys report that non obstructing kidney stones and enlarged prostate  No treatment is needed at this time for kidney stones  I will see patient again in 3 months to follow up hypertension

## 2021-03-27 ENCOUNTER — HOSPITAL ENCOUNTER (EMERGENCY)
Facility: HOSPITAL | Age: 61
Discharge: HOME | End: 2021-03-27
Attending: EMERGENCY MEDICINE
Payer: COMMERCIAL

## 2021-03-27 VITALS
TEMPERATURE: 98.1 F | OXYGEN SATURATION: 100 % | WEIGHT: 196.21 LBS | SYSTOLIC BLOOD PRESSURE: 131 MMHG | HEART RATE: 82 BPM | RESPIRATION RATE: 18 BRPM | DIASTOLIC BLOOD PRESSURE: 85 MMHG | BODY MASS INDEX: 29.83 KG/M2

## 2021-03-27 DIAGNOSIS — R05.9 COUGH: Primary | ICD-10-CM

## 2021-03-27 DIAGNOSIS — J30.9 ALLERGIC RHINITIS: ICD-10-CM

## 2021-03-27 PROCEDURE — 99282 EMERGENCY DEPT VISIT SF MDM: CPT | Performed by: EMERGENCY MEDICINE

## 2021-03-27 PROCEDURE — 99283 EMERGENCY DEPT VISIT LOW MDM: CPT

## 2021-03-27 RX ORDER — LORATADINE 10 MG/1
10 TABLET ORAL DAILY
Qty: 20 TABLET | Refills: 0 | Status: SHIPPED | OUTPATIENT
Start: 2021-03-27 | End: 2022-02-25 | Stop reason: ALTCHOICE

## 2021-03-27 RX ORDER — FLUTICASONE PROPIONATE 50 MCG
1 SPRAY, SUSPENSION (ML) NASAL DAILY
Qty: 16 G | Refills: 0 | Status: SHIPPED | OUTPATIENT
Start: 2021-03-27 | End: 2022-02-25 | Stop reason: ALTCHOICE

## 2021-04-18 DIAGNOSIS — I10 ESSENTIAL HYPERTENSION: ICD-10-CM

## 2021-04-20 RX ORDER — LISINOPRIL AND HYDROCHLOROTHIAZIDE 20; 12.5 MG/1; MG/1
TABLET ORAL
Qty: 30 TABLET | Refills: 5 | Status: SHIPPED | OUTPATIENT
Start: 2021-04-20 | End: 2021-06-10

## 2021-04-28 ENCOUNTER — OFFICE VISIT (OUTPATIENT)
Dept: FAMILY MEDICINE CLINIC | Facility: CLINIC | Age: 61
End: 2021-04-28
Payer: COMMERCIAL

## 2021-04-28 VITALS
HEART RATE: 96 BPM | HEIGHT: 68 IN | WEIGHT: 198 LBS | DIASTOLIC BLOOD PRESSURE: 70 MMHG | SYSTOLIC BLOOD PRESSURE: 130 MMHG | OXYGEN SATURATION: 97 % | RESPIRATION RATE: 16 BRPM | BODY MASS INDEX: 30.01 KG/M2 | TEMPERATURE: 98 F

## 2021-04-28 DIAGNOSIS — I10 ESSENTIAL HYPERTENSION: Primary | ICD-10-CM

## 2021-04-28 DIAGNOSIS — N18.31 STAGE 3A CHRONIC KIDNEY DISEASE (HCC): ICD-10-CM

## 2021-04-28 PROCEDURE — 99214 OFFICE O/P EST MOD 30 MIN: CPT | Performed by: FAMILY MEDICINE

## 2021-04-28 NOTE — PROGRESS NOTES
Assessment/Plan:  I reviewed previous labs in discussed with patient  Blood pressure well control continue same treatment  His kidney function has been decrease and possible related to the use of lisinopril  The nuclear decrease is not in of to stop medication  We will check kidney ultrasound to rule out secondary causes  No problem-specific Assessment & Plan notes found for this encounter  Diagnoses and all orders for this visit:    Essential hypertension    Stage 3a chronic kidney disease ([facility] [address] )  -     US kidney and bladder; Future  -     Basic metabolic panel; Future          Subjective:      Patient ID: Bryon Godoy is a 64 y o  male  Patient is here to follow HTN, patient states good compliance with treatment  Denies chest pain, shortness of breath, urinary problems  No exercise but follows low salt diet  Current medication includes: ACE inhibitor agents  Lab Results       Component                Value               Date/Time                  HGBA1C                   5 7 (H)             01/14/2021 10:15 AM        EGFR                     55 (L)              04/29/2021 03:34 PM        LDLCALC                  114                 01/14/2021 10:15 AM   atorvastatin  fluticasone  lisinopril-hydrochlorothiazide  loratadine  sildenafil        The following portions of the patient's history were reviewed and updated as appropriate: allergies, current medications, past family history, past medical history, past social history, past surgical history and problem list     Review of Systems   Constitutional: Negative for diaphoresis, fatigue, fever and unexpected weight change  Respiratory: Negative for apnea, cough, choking, chest tightness and shortness of breath  Cardiovascular: Negative for chest pain, palpitations and leg swelling  Gastrointestinal: Negative for abdominal distention, abdominal pain, anal bleeding, blood in stool and constipation     Genitourinary:        He continues having difficulty with erectile dysfunction and previous dose of she does admit to works for him  He wants refills  Musculoskeletal: Negative for arthralgias, back pain, gait problem and joint swelling  Neurological: Negative for dizziness, facial asymmetry, light-headedness and headaches  Psychiatric/Behavioral: Negative for behavioral problems, dysphoric mood and self-injury  The patient is not nervous/anxious  Objective:      /70 (BP Location: Left arm, Patient Position: Sitting, Cuff Size: Standard)   Pulse 96   Temp 98 °F (36 7 °C) (Temporal)   Resp 16   Ht 5' 8" (1 727 m)   Wt 89 8 kg (198 lb)   SpO2 97%   BMI 30 11 kg/m²          Physical Exam  Vitals signs and nursing note reviewed  Neck:      Musculoskeletal: No edema or neck rigidity  Thyroid: No thyroid mass or thyromegaly  Vascular: No carotid bruit or JVD  Trachea: No tracheal tenderness  Cardiovascular:      Rate and Rhythm: Normal rate and regular rhythm  No extrasystoles are present  Pulses: Normal pulses  Heart sounds: Normal heart sounds  Heart sounds not distant  No friction rub  Pulmonary:      Effort: Pulmonary effort is normal  No tachypnea or bradypnea  Breath sounds: Normal breath sounds  No stridor  Abdominal:      General: Bowel sounds are normal  There is no abdominal bruit  Palpations: Abdomen is soft  There is no hepatomegaly or splenomegaly  Hernia: No hernia is present  Musculoskeletal: Normal range of motion  Skin:     General: Skin is warm and dry  Neurological:      Mental Status: He is oriented to person, place, and time  Deep Tendon Reflexes: Reflexes are normal and symmetric  Psychiatric:         Behavior: Behavior normal          Thought Content:  Thought content normal          Judgment: Judgment normal

## 2021-04-29 ENCOUNTER — LAB (OUTPATIENT)
Dept: LAB | Facility: HOSPITAL | Age: 61
End: 2021-04-29
Payer: COMMERCIAL

## 2021-04-29 DIAGNOSIS — N18.31 STAGE 3A CHRONIC KIDNEY DISEASE (HCC): ICD-10-CM

## 2021-04-29 LAB
ANION GAP SERPL CALCULATED.3IONS-SCNC: 7 MMOL/L (ref 5–14)
BUN SERPL-MCNC: 19 MG/DL (ref 5–25)
CALCIUM SERPL-MCNC: 9 MG/DL (ref 8.4–10.2)
CHLORIDE SERPL-SCNC: 110 MMOL/L (ref 97–108)
CO2 SERPL-SCNC: 27 MMOL/L (ref 22–30)
CREAT SERPL-MCNC: 1.41 MG/DL (ref 0.7–1.5)
GFR SERPL CREATININE-BSD FRML MDRD: 55 ML/MIN/1.73SQ M
GLUCOSE P FAST SERPL-MCNC: 76 MG/DL (ref 70–99)
POTASSIUM SERPL-SCNC: 4 MMOL/L (ref 3.6–5)
SODIUM SERPL-SCNC: 144 MMOL/L (ref 137–147)

## 2021-04-29 PROCEDURE — 36415 COLL VENOUS BLD VENIPUNCTURE: CPT

## 2021-04-29 PROCEDURE — 80048 BASIC METABOLIC PNL TOTAL CA: CPT

## 2021-05-04 ENCOUNTER — HOSPITAL ENCOUNTER (OUTPATIENT)
Dept: ULTRASOUND IMAGING | Facility: HOSPITAL | Age: 61
Discharge: HOME | End: 2021-05-04
Payer: COMMERCIAL

## 2021-05-04 DIAGNOSIS — I10 ESSENTIAL HYPERTENSION: ICD-10-CM

## 2021-05-04 DIAGNOSIS — N18.31 STAGE 3A CHRONIC KIDNEY DISEASE (HCC): ICD-10-CM

## 2021-05-04 PROCEDURE — 76770 US EXAM ABDO BACK WALL COMP: CPT

## 2021-05-04 RX ORDER — ATORVASTATIN CALCIUM 20 MG/1
20 TABLET, FILM COATED ORAL DAILY
Qty: 90 TABLET | Refills: 3 | Status: SHIPPED | OUTPATIENT
Start: 2021-05-04 | End: 2022-02-28

## 2021-05-04 NOTE — RESULT ENCOUNTER NOTE
Please call patient, there is a slight improvement in the kidney function  please keep same treatment

## 2021-05-14 DIAGNOSIS — N18.31 STAGE 3A CHRONIC KIDNEY DISEASE (HCC): Primary | ICD-10-CM

## 2021-05-14 NOTE — RESULT ENCOUNTER NOTE
Please call patient:  The ultrasound is showing same changes from January  He seems having a permanent kidney damage  He needs to follow up with kidney specialist   Referral in the system

## 2021-05-18 ENCOUNTER — TELEPHONE (OUTPATIENT)
Dept: FAMILY MEDICINE CLINIC | Facility: CLINIC | Age: 61
End: 2021-05-18

## 2021-05-18 NOTE — TELEPHONE ENCOUNTER
Patient came to the office stating that he was supposed to have a medication for his cholesterol sent to the Pharmacy

## 2021-05-27 ENCOUNTER — TELEPHONE (OUTPATIENT)
Dept: FAMILY MEDICINE CLINIC | Facility: CLINIC | Age: 61
End: 2021-05-27

## 2021-05-27 NOTE — TELEPHONE ENCOUNTER
----- Message from Natalie Lopez MD sent at 5/14/2021 10:17 AM EDT -----  Please call patient:  The ultrasound is showing same changes from January  He seems having a permanent kidney damage  He needs to follow up with kidney specialist   Referral in the system

## 2021-05-31 ENCOUNTER — HOSPITAL ENCOUNTER (EMERGENCY)
Facility: HOSPITAL | Age: 61
Discharge: HOME | End: 2021-05-31
Attending: EMERGENCY MEDICINE | Admitting: EMERGENCY MEDICINE
Payer: COMMERCIAL

## 2021-05-31 ENCOUNTER — APPOINTMENT (EMERGENCY)
Dept: CT IMAGING | Facility: HOSPITAL | Age: 61
End: 2021-05-31
Payer: COMMERCIAL

## 2021-05-31 VITALS
HEART RATE: 90 BPM | BODY MASS INDEX: 29.85 KG/M2 | RESPIRATION RATE: 16 BRPM | DIASTOLIC BLOOD PRESSURE: 79 MMHG | WEIGHT: 196.3 LBS | TEMPERATURE: 97.1 F | SYSTOLIC BLOOD PRESSURE: 122 MMHG | OXYGEN SATURATION: 100 %

## 2021-05-31 DIAGNOSIS — R79.82 ELEVATED C-REACTIVE PROTEIN: ICD-10-CM

## 2021-05-31 DIAGNOSIS — N20.0 KIDNEY STONE: Primary | ICD-10-CM

## 2021-05-31 DIAGNOSIS — N28.1 KIDNEY CYSTS: ICD-10-CM

## 2021-05-31 LAB
ALBUMIN SERPL BCP-MCNC: 4.2 G/DL (ref 3–5.2)
ALP SERPL-CCNC: 81 U/L (ref 43–122)
ALT SERPL W P-5'-P-CCNC: 38 U/L
ANION GAP SERPL CALCULATED.3IONS-SCNC: 5 MMOL/L (ref 5–14)
AST SERPL W P-5'-P-CCNC: 37 U/L (ref 17–59)
BASOPHILS # BLD AUTO: 0 THOUSANDS/ΜL (ref 0–0.1)
BASOPHILS NFR BLD AUTO: 1 % (ref 0–1)
BILIRUB SERPL-MCNC: 0.85 MG/DL
BILIRUB UR QL STRIP: NEGATIVE
BUN SERPL-MCNC: 24 MG/DL (ref 5–25)
CALCIUM SERPL-MCNC: 9.6 MG/DL (ref 8.4–10.2)
CHLORIDE SERPL-SCNC: 104 MMOL/L (ref 97–108)
CLARITY UR: CLEAR
CO2 SERPL-SCNC: 32 MMOL/L (ref 22–30)
COLOR UR: NORMAL
CREAT SERPL-MCNC: 1.52 MG/DL (ref 0.7–1.5)
EOSINOPHIL # BLD AUTO: 0.3 THOUSAND/ΜL (ref 0–0.4)
EOSINOPHIL NFR BLD AUTO: 5 % (ref 0–6)
ERYTHROCYTE [DISTWIDTH] IN BLOOD BY AUTOMATED COUNT: 13.3 %
GFR SERPL CREATININE-BSD FRML MDRD: 50 ML/MIN/1.73SQ M
GLUCOSE SERPL-MCNC: 109 MG/DL (ref 70–99)
GLUCOSE UR STRIP-MCNC: NEGATIVE MG/DL
HCT VFR BLD AUTO: 45.7 % (ref 41–53)
HGB BLD-MCNC: 15.4 G/DL (ref 13.5–17.5)
HGB UR QL STRIP.AUTO: NEGATIVE
KETONES UR STRIP-MCNC: NEGATIVE MG/DL
LEUKOCYTE ESTERASE UR QL STRIP: NEGATIVE
LIPASE SERPL-CCNC: 64 U/L (ref 23–300)
LYMPHOCYTES # BLD AUTO: 1.4 THOUSANDS/ΜL (ref 0.5–4)
LYMPHOCYTES NFR BLD AUTO: 25 % (ref 25–45)
MCH RBC QN AUTO: 31.3 PG (ref 26–34)
MCHC RBC AUTO-ENTMCNC: 33.6 G/DL (ref 31–36)
MCV RBC AUTO: 93 FL (ref 80–100)
MONOCYTES # BLD AUTO: 0.4 THOUSAND/ΜL (ref 0.2–0.9)
MONOCYTES NFR BLD AUTO: 7 % (ref 1–10)
NEUTROPHILS # BLD AUTO: 3.7 THOUSANDS/ΜL (ref 1.8–7.8)
NEUTS SEG NFR BLD AUTO: 63 % (ref 45–65)
NITRITE UR QL STRIP: NEGATIVE
PH UR STRIP.AUTO: 6 [PH]
PLATELET # BLD AUTO: 186 THOUSANDS/UL (ref 150–450)
PMV BLD AUTO: 9.8 FL (ref 8.9–12.7)
POTASSIUM SERPL-SCNC: 4 MMOL/L (ref 3.6–5)
PROT SERPL-MCNC: 8.1 G/DL (ref 5.9–8.4)
PROT UR STRIP-MCNC: NEGATIVE MG/DL
RBC # BLD AUTO: 4.91 MILLION/UL (ref 4.5–5.9)
SODIUM SERPL-SCNC: 141 MMOL/L (ref 137–147)
SP GR UR STRIP.AUTO: 1.01 (ref 1–1.04)
UROBILINOGEN UA: NEGATIVE MG/DL
WBC # BLD AUTO: 5.8 THOUSAND/UL (ref 4.5–11)

## 2021-05-31 PROCEDURE — 99284 EMERGENCY DEPT VISIT MOD MDM: CPT

## 2021-05-31 PROCEDURE — 99282 EMERGENCY DEPT VISIT SF MDM: CPT | Performed by: PHYSICIAN ASSISTANT

## 2021-05-31 PROCEDURE — 36415 COLL VENOUS BLD VENIPUNCTURE: CPT | Performed by: PHYSICIAN ASSISTANT

## 2021-05-31 PROCEDURE — 85025 COMPLETE CBC W/AUTO DIFF WBC: CPT | Performed by: PHYSICIAN ASSISTANT

## 2021-05-31 PROCEDURE — G1004 CDSM NDSC: HCPCS

## 2021-05-31 PROCEDURE — 81003 URINALYSIS AUTO W/O SCOPE: CPT | Performed by: PHYSICIAN ASSISTANT

## 2021-05-31 PROCEDURE — 80053 COMPREHEN METABOLIC PANEL: CPT | Performed by: PHYSICIAN ASSISTANT

## 2021-05-31 PROCEDURE — 74176 CT ABD & PELVIS W/O CONTRAST: CPT

## 2021-05-31 PROCEDURE — 83690 ASSAY OF LIPASE: CPT | Performed by: PHYSICIAN ASSISTANT

## 2021-05-31 NOTE — ED NOTES
Transported to CT via wheel chair with Salinas Orr from radiology  Patient stable        [facility]AL[facility]  05/31/21 2123

## 2021-05-31 NOTE — ED PROVIDER NOTES
History  Chief Complaint   Patient presents with    Flank Pain     left flank pain  States has began yesterday and has worsened  [place] patient, states has an appt in Aug to ssee a specialist for further testing for his kidneys  Denies urinary symptoms (burning with urination, urinary frequency etc )     43-year-old male with past medical history of CKD, hypertension, kidney stones presenting for evaluation of left-sided flank pain starting last night  Patient did recently go for outpatient ultrasound of kidney and bladder which showed 'Unchanged increased bilateral renal parenchymal echogenicity compatible with medical renal disease  No hydronephrosis  ' pt was advised to see nephrologist   Patient denies any urinary symptoms  No nausea vomiting or diarrhea  Denies taking anything for pain prior to arrival           Prior to Admission Medications   Prescriptions Last Dose Informant Patient Reported? Taking?   atorvastatin (LIPITOR) 20 mg tablet   No No   Sig: Take 1 tablet (20 mg total) by mouth daily   fluticasone (FLONASE) 50 mcg/act nasal spray   No No   Si spray into each nostril daily   lisinopril-hydrochlorothiazide (PRINZIDE,ZESTORETIC) 20-12 5 MG per tablet   No No   Sig: TAKE ONE TABLET BY MOUTH DAILY   loratadine (CLARITIN) 10 mg tablet   No No   Sig: Take 1 tablet (10 mg total) by mouth daily   sildenafil (REVATIO) 20 mg tablet   No No   Sig: Take 2 tablets (40 mg total) by mouth daily      Facility-Administered Medications: None       Past Medical History:   Diagnosis Date    Chronic kidney disease     Enlarged prostate     Hypertension     Kidney stone        Past Surgical History:   Procedure Laterality Date    HERNIA REPAIR         History reviewed  No pertinent family history  I have reviewed and agree with the history as documented      E-Cigarette/Vaping    E-Cigarette Use Never User      E-Cigarette/Vaping Substances     Social History     Tobacco Use    Smoking status: Never Smoker    Smokeless tobacco: Never Used   Substance Use Topics    Alcohol use: Not Currently    Drug use: Not Currently       Review of Systems   All other systems reviewed and are negative  Physical Exam  Physical Exam  Vitals signs and nursing note reviewed  Constitutional:       General: He is not in acute distress  Appearance: Normal appearance  He is well-developed  He is not ill-appearing, toxic-appearing or diaphoretic  HENT:      Head: Normocephalic and atraumatic  Eyes:      Conjunctiva/sclera: Conjunctivae normal       Comments: EOM grossly intact   Neck:      Musculoskeletal: Normal range of motion and neck supple  Vascular: No JVD  Cardiovascular:      Rate and Rhythm: Normal rate  Pulmonary:      Effort: Pulmonary effort is normal    Abdominal:      General: Abdomen is flat  There is no distension  Palpations: Abdomen is soft  Tenderness: There is no abdominal tenderness  There is no right CVA tenderness or left CVA tenderness  Musculoskeletal:      Comments: FROM, steady gait, cap refill brisk, strength and sensation grossly intact throughout   Skin:     General: Skin is warm and dry  Capillary Refill: Capillary refill takes less than 2 seconds  Neurological:      Mental Status: He is alert and oriented to person, place, and time     Psychiatric:         Behavior: Behavior normal          Vital Signs  ED Triage Vitals [05/31/21 1058]   Temperature Pulse Respirations Blood Pressure SpO2   (!) 97 1 °F (36 2 °C) 91 17 125/84 99 %      Temp Source Heart Rate Source Patient Position - Orthostatic VS BP Location FiO2 (%)   Tympanic Monitor Sitting Left arm --      Pain Score       5           Vitals:    05/31/21 1058   BP: 125/84   Pulse: 91   Patient Position - Orthostatic VS: Sitting         Visual Acuity      ED Medications  Medications - No data to display    Diagnostic Studies  Results Reviewed     Procedure Component Value Units Date/Time    Lipase [[id number]]  (Normal) Collected: 05/31/21 1148    Lab Status: Final result Specimen: Blood from Arm, Right Updated: 05/31/21 1301     Lipase 64 u/L     Comprehensive metabolic panel [[id number]]  (Abnormal) Collected: 05/31/21 1148    Lab Status: Final result Specimen: Blood from Arm, Right Updated: 05/31/21 1301     Sodium 141 mmol/L      Potassium 4 0 mmol/L      Chloride 104 mmol/L      CO2 32 mmol/L      ANION GAP 5 mmol/L      BUN 24 mg/dL      Creatinine 1 52 mg/dL      Glucose 109 mg/dL      Calcium 9 6 mg/dL      AST 37 U/L      ALT 38 U/L      Alkaline Phosphatase 81 U/L      Total Protein 8 1 g/dL      Albumin 4 2 g/dL      Total Bilirubin 0 85 mg/dL      eGFR 50 ml/min/1 73sq m     Narrative:      National Kidney Disease Foundation guidelines for Chronic Kidney Disease (CKD):     Stage 1 with normal or high GFR (GFR > 90 mL/min/1 73 square meters)    Stage 2 Mild CKD (GFR = 60-89 mL/min/1 73 square meters)    Stage 3A Moderate CKD (GFR = 45-59 mL/min/1 73 square meters)    Stage 3B Moderate CKD (GFR = 30-44 mL/min/1 73 square meters)    Stage 4 Severe CKD (GFR = 15-29 mL/min/1 73 square meters)    Stage 5 End Stage CKD (GFR <15 mL/min/1 73 square meters)  Note: GFR calculation is accurate only with a steady state creatinine    UA w Reflex to Microscopic w Reflex to Culture [[id number]]  (Normal) Collected: 05/31/21 1147    Lab Status: Final result Specimen: Urine, Clean Catch Updated: 05/31/21 1223     Color, UA Straw     Clarity, UA Clear     Specific Gravity, UA 1 015     pH, UA 6 0     Leukocytes, UA Negative     Nitrite, UA Negative     Protein, UA Negative mg/dl      Glucose, UA Negative mg/dl      Ketones, UA Negative mg/dl      Bilirubin, UA Negative     Blood, UA Negative     UROBILINOGEN UA Negative mg/dL     CBC and differential [[id number]]  (Normal) Collected: 05/31/21 1148    Lab Status: Final result Specimen: Blood from Arm, Right Updated: 05/31/21 1206     WBC 5 80 Thousand/uL      RBC 4 91 Million/uL      Hemoglobin 15 4 g/dL      Hematocrit 45 7 %      MCV 93 fL      MCH 31 3 pg      MCHC 33 6 g/dL      RDW 13 3 %      MPV 9 8 fL      Platelets 600 Thousands/uL      Neutrophils Relative 63 %      Lymphocytes Relative 25 %      Monocytes Relative 7 %      Eosinophils Relative 5 %      Basophils Relative 1 %      Neutrophils Absolute 3 70 Thousands/µL      Lymphocytes Absolute 1 40 Thousands/µL      Monocytes Absolute 0 40 Thousand/µL      Eosinophils Absolute 0 30 Thousand/µL      Basophils Absolute 0 00 Thousands/µL                  CT renal stone study abdomen pelvis wo contrast   Final Result by Yanet Rodgers MD (05/31 1250)         1  Bilateral nonobstructing renal calculi, largest measuring 9 mm on the right side and 3 mm on the left side  No hydronephrosis or evidence of obstructive uropathy  2   Diffuse osteopenia  Workstation performed: [id number]                    Procedures  Procedures         ED Course  ED Course as of May 31 1329   Mon May 31, 2021   1318 Steadily increasing CREAT however only 0 11 increase month ago   Creatinine(!): 1 52                             SBIRT 22yo+      Most Recent Value   SBIRT (25 yo +)   In order to provide better care to our patients, we are screening all of our patients for alcohol and drug use  Would it be okay to ask you these screening questions? Yes Filed at: 05/31/2021 1153   Initial Alcohol Screen: US AUDIT-C    1  How often do you have a drink containing alcohol?  0 Filed at: 05/31/2021 1153   2  How many drinks containing alcohol do you have on a typical day you are drinking? 0 Filed at: 05/31/2021 1153   3a  Male UNDER 65: How often do you have five or more drinks on one occasion? 0 Filed at: 05/31/2021 1153   Audit-C Score  0 Filed at: 05/31/2021 1153   IGOR: How many times in the past year have you    Used an illegal drug or used a prescription medication for non-medical reasons?   Never Filed at: 05/31/2021 1153 MDM  Number of Diagnoses or Management Options  Elevated C-reactive protein:   Kidney cysts:   Kidney stone:   Diagnosis management comments: 68-year-old male presenting for evaluation left-sided flank pain, patient has unremarkable urinalysis, slightly elevated creatinine only elevated 0 1 from previous 1 month ago, patient also with incidental finding of renal cyst and renal calculus that is nonobstructing, patient was advised of incidental findings on CT scan, advised to f/u with nephrology and pcp as an outpatient    All labs and imaging discussed with patient, strict return to ED precautions discussed  Pt verbalizes understanding and agrees with plan  Pt is stable for discharge    Portions of the record may have been created with voice recognition software  Occasional wrong word or "sound a like" substitutions may have occurred due to the inherent limitations of voice recognition software  Read the chart carefully and recognize, using context, where substitutions have occurred  Disposition  Final diagnoses:   Kidney stone   Kidney cysts   Elevated C-reactive protein     Time reflects when diagnosis was documented in both MDM as applicable and the Disposition within this note     Time User Action Codes Description Comment    5/31/2021  1:25 PM Shanika Congress Add [N20 0] Kidney stone     5/31/2021  1:25 PM [facility] Add [N28 1] Kidney cysts     5/31/2021  1:26 PM [facility] Add [R79 82] Elevated C-reactive protein       ED Disposition     ED Disposition Condition Date/Time Comment    Discharge Stable Mon May 31, 2021  1:25 PM Rianna Vela discharge to home/self care              Follow-up Information     Follow up With Specialties Details Why Contact Info Additional Information    Junie Kidd MD Family Medicine Call in 1 day  [address] [phone] Emergency Department Emergency Medicine Go to  If symptoms worsen [phone] [phone]  [address] [facility] Emergency Department    [facility] [place] [facility] Call in 1 day  [address]  [facility] [address] [place] [id number] 049 [facility], [address], [id number] [phone]          Patient's Medications   Discharge Prescriptions    No medications on file     No discharge procedures on file      PDMP Review     None          ED Provider  Electronically Signed by           Marsha Neville PA-C  05/31/21 4589

## 2021-06-10 ENCOUNTER — APPOINTMENT (OUTPATIENT)
Dept: LAB | Facility: HOSPITAL | Age: 61
End: 2021-06-10
Payer: COMMERCIAL

## 2021-06-10 ENCOUNTER — OFFICE VISIT (OUTPATIENT)
Dept: FAMILY MEDICINE CLINIC | Facility: CLINIC | Age: 61
End: 2021-06-10
Payer: COMMERCIAL

## 2021-06-10 VITALS
TEMPERATURE: 98 F | BODY MASS INDEX: 30.01 KG/M2 | WEIGHT: 198 LBS | SYSTOLIC BLOOD PRESSURE: 132 MMHG | HEIGHT: 68 IN | DIASTOLIC BLOOD PRESSURE: 83 MMHG | OXYGEN SATURATION: 99 % | RESPIRATION RATE: 18 BRPM | HEART RATE: 105 BPM

## 2021-06-10 DIAGNOSIS — I10 ESSENTIAL HYPERTENSION: Primary | ICD-10-CM

## 2021-06-10 DIAGNOSIS — N20.0 RENAL CALCULI: ICD-10-CM

## 2021-06-10 DIAGNOSIS — I10 ESSENTIAL HYPERTENSION: ICD-10-CM

## 2021-06-10 DIAGNOSIS — N18.31 STAGE 3A CHRONIC KIDNEY DISEASE (HCC): ICD-10-CM

## 2021-06-10 DIAGNOSIS — J02.9 SORE THROAT: ICD-10-CM

## 2021-06-10 LAB
ALBUMIN SERPL BCP-MCNC: 4.2 G/DL (ref 3–5.2)
ALP SERPL-CCNC: 69 U/L (ref 43–122)
ALT SERPL W P-5'-P-CCNC: 25 U/L
ANION GAP SERPL CALCULATED.3IONS-SCNC: 7 MMOL/L (ref 5–14)
AST SERPL W P-5'-P-CCNC: 27 U/L (ref 17–59)
BILIRUB SERPL-MCNC: 0.58 MG/DL
BUN SERPL-MCNC: 25 MG/DL (ref 5–25)
CALCIUM SERPL-MCNC: 9.5 MG/DL (ref 8.4–10.2)
CHLORIDE SERPL-SCNC: 108 MMOL/L (ref 97–108)
CO2 SERPL-SCNC: 26 MMOL/L (ref 22–30)
CREAT SERPL-MCNC: 1.85 MG/DL (ref 0.7–1.5)
GFR SERPL CREATININE-BSD FRML MDRD: 39 ML/MIN/1.73SQ M
GLUCOSE SERPL-MCNC: 81 MG/DL (ref 70–99)
POTASSIUM SERPL-SCNC: 4.5 MMOL/L (ref 3.6–5)
PROT SERPL-MCNC: 7.7 G/DL (ref 5.9–8.4)
S PYO AG THROAT QL: NEGATIVE
SODIUM SERPL-SCNC: 141 MMOL/L (ref 137–147)

## 2021-06-10 PROCEDURE — 80053 COMPREHEN METABOLIC PANEL: CPT

## 2021-06-10 PROCEDURE — 87880 STREP A ASSAY W/OPTIC: CPT | Performed by: NURSE PRACTITIONER

## 2021-06-10 PROCEDURE — 36415 COLL VENOUS BLD VENIPUNCTURE: CPT

## 2021-06-10 PROCEDURE — 99214 OFFICE O/P EST MOD 30 MIN: CPT | Performed by: NURSE PRACTITIONER

## 2021-06-10 RX ORDER — LISINOPRIL 20 MG/1
20 TABLET ORAL DAILY
Qty: 30 TABLET | Refills: 5 | Status: SHIPPED | OUTPATIENT
Start: 2021-06-10 | End: 2021-08-03 | Stop reason: SDUPTHER

## 2021-06-10 NOTE — PROGRESS NOTES
Assessment/Plan:    Essential hypertension  -Blood pressure controlled today  We are changing his BP medication lisinopril-hctz and stopping the HCTZ component and leaving him on lisinopril 20mg daily due to decrease kidney function  Pt in agreement with plan and will f/u with nephrologist      Stage 3a chronic kidney disease ([facility] [address] )  Lab Results   Component Value Date    EGFR 50 (L) 05/31/2021    EGFR 55 (L) 04/29/2021    EGFR 51 (L) 01/14/2021    CREATININE 1 52 (H) 05/31/2021    CREATININE 1 41 04/29/2021    CREATININE 1 48 01/14/2021   -pt has appointment with nephrologist in 8/5 but he should avoid NSAIDS  To keep t his appointment  Sore throat  POCT strep negative  I am recommending Tylenol for fever and pain  I am recommending supportive care  Increasing in fluid intake  Other supportive measures are warm salt water gargles, throat lozenges, hard candy, frozen desserts, humidifier and topical analgesics  Diagnoses and all orders for this visit:    Essential hypertension  -     Comprehensive metabolic panel; Future  -     lisinopril (ZESTRIL) 20 mg tablet; Take 1 tablet (20 mg total) by mouth daily    Stage 3a chronic kidney disease (HCC)    Renal calculi    Sore throat  -     POCT rapid strepA          Subjective:      Patient ID: Mauro Muñoz is a 64 y o  male  64year old male patient here for follow up due to kidney pain on 5/31  He was advised due to bilateral non-obstructing renal calculi on right and left side each measuring 9mm and 3mm  His creatinine is elevated and low GFR and patient was advised to see a nephrologist  Today he is no longer having pain per patient and no issues with urinating  Yesterday  He started to feel like a burning sensation on his throat  Pain scale 4/10  Suffers from allergies and this comes and goes  No cough per patient  Sore Throat   This is a new problem  The current episode started yesterday  The problem has been waxing and waning   Neither side of throat is experiencing more pain than the other  There has been no fever  The fever has been present for less than 1 day  The pain is at a severity of 4/10  The pain is mild  Pertinent negatives include no congestion, coughing, ear discharge, ear pain, headaches, neck pain, shortness of breath, swollen glands or trouble swallowing  He has had no exposure to strep or mono  He has tried acetaminophen for the symptoms  The treatment provided moderate relief  The following portions of the patient's history were reviewed and updated as appropriate: allergies, current medications, past family history, past medical history, past social history, past surgical history and problem list     Review of Systems   Constitutional: Negative  Negative for appetite change, fatigue and fever  HENT: Positive for sore throat  Negative for congestion, ear discharge, ear pain, postnasal drip, rhinorrhea, trouble swallowing and voice change  Eyes: Negative  Respiratory: Negative  Negative for cough, chest tightness, shortness of breath and wheezing  Cardiovascular: Negative  Negative for chest pain and palpitations  Gastrointestinal: Negative  Negative for abdominal distention  Endocrine: Negative  Genitourinary: Negative  Negative for difficulty urinating  Musculoskeletal: Negative  Negative for arthralgias and neck pain  Skin: Negative  Negative for color change and rash  Allergic/Immunologic: Positive for environmental allergies  Neurological: Negative  Negative for dizziness and headaches  Hematological: Negative  Does not bruise/bleed easily  Psychiatric/Behavioral: Negative  Objective:      /83 (BP Location: Right arm, Patient Position: Sitting, Cuff Size: Standard)   Pulse 105   Temp 98 °F (36 7 °C) (Temporal)   Resp 18   Ht 5' 8" (1 727 m)   Wt 89 8 kg (198 lb)   SpO2 99%   BMI 30 11 kg/m²          Physical Exam  Vitals signs and nursing note reviewed     Constitutional: General: He is not in acute distress  Appearance: Normal appearance  He is obese  He is not ill-appearing  HENT:      Head: Normocephalic and atraumatic  Right Ear: External ear normal       Left Ear: External ear normal       Nose: Nose normal  No congestion or rhinorrhea  Mouth/Throat:      Pharynx: Oropharynx is clear  Posterior oropharyngeal erythema present  No oropharyngeal exudate  Eyes:      Extraocular Movements: Extraocular movements intact  Conjunctiva/sclera: Conjunctivae normal       Pupils: Pupils are equal, round, and reactive to light  Neck:      Musculoskeletal: Normal range of motion and neck supple  Cardiovascular:      Rate and Rhythm: Normal rate and regular rhythm  Pulses: Normal pulses  Heart sounds: Normal heart sounds  Pulmonary:      Effort: Pulmonary effort is normal  No respiratory distress  Breath sounds: Normal breath sounds  Abdominal:      General: Bowel sounds are normal       Palpations: Abdomen is soft  Tenderness: There is no right CVA tenderness or left CVA tenderness  Musculoskeletal: Normal range of motion  Skin:     General: Skin is warm and dry  Capillary Refill: Capillary refill takes less than 2 seconds  Neurological:      General: No focal deficit present  Mental Status: He is alert and oriented to person, place, and time     Psychiatric:         Mood and Affect: Mood normal          Behavior: Behavior normal              Chief Complaint   Patient presents with    post ER followup     kidney stone

## 2021-06-10 NOTE — ASSESSMENT & PLAN NOTE
-Blood pressure controlled today  We are changing his BP medication lisinopril-hctz and stopping the HCTZ component and leaving him on lisinopril 20mg daily due to decrease kidney function   Pt in agreement with plan and will f/u with nephrologist

## 2021-06-10 NOTE — ASSESSMENT & PLAN NOTE
Lab Results   Component Value Date    EGFR 50 (L) 05/31/2021    EGFR 55 (L) 04/29/2021    EGFR 51 (L) 01/14/2021    CREATININE 1 52 (H) 05/31/2021    CREATININE 1 41 04/29/2021    CREATININE 1 48 01/14/2021   -pt has appointment with nephrologist in 8/5 but he should avoid NSAIDS  To keep t his appointment

## 2021-06-10 NOTE — ASSESSMENT & PLAN NOTE
POCT strep negative  I am recommending Tylenol for fever and pain  I am recommending supportive care  Increasing in fluid intake  Other supportive measures are warm salt water gargles, throat lozenges, hard candy, frozen desserts, humidifier and topical analgesics

## 2021-06-14 ENCOUNTER — TELEPHONE (OUTPATIENT)
Dept: FAMILY MEDICINE CLINIC | Facility: CLINIC | Age: 61
End: 2021-06-14

## 2021-06-14 NOTE — TELEPHONE ENCOUNTER
Call pt LVM need to call office back for results  ----- Message from Jones Faith, [address] sent at 6/14/2021 11:41 AM EDT -----  Please call patient and inform that lab results are still showing elevated creatinine  Recommend to keep his f/u with nephrologist Dr Dunia Smyth

## 2021-06-23 ENCOUNTER — OFFICE VISIT (OUTPATIENT)
Dept: FAMILY MEDICINE CLINIC | Facility: CLINIC | Age: 61
End: 2021-06-23
Payer: COMMERCIAL

## 2021-06-23 VITALS
HEART RATE: 93 BPM | OXYGEN SATURATION: 97 % | HEIGHT: 68 IN | WEIGHT: 200 LBS | BODY MASS INDEX: 30.31 KG/M2 | TEMPERATURE: 98.3 F | DIASTOLIC BLOOD PRESSURE: 81 MMHG | SYSTOLIC BLOOD PRESSURE: 120 MMHG | RESPIRATION RATE: 18 BRPM

## 2021-06-23 DIAGNOSIS — R05.9 COUGH: ICD-10-CM

## 2021-06-23 DIAGNOSIS — J30.1 SEASONAL ALLERGIC RHINITIS DUE TO POLLEN: Primary | ICD-10-CM

## 2021-06-23 DIAGNOSIS — J02.9 SORE THROAT: ICD-10-CM

## 2021-06-23 PROCEDURE — 87070 CULTURE OTHR SPECIMN AEROBIC: CPT | Performed by: NURSE PRACTITIONER

## 2021-06-23 PROCEDURE — 99214 OFFICE O/P EST MOD 30 MIN: CPT | Performed by: NURSE PRACTITIONER

## 2021-06-23 RX ORDER — AZELASTINE 1 MG/ML
1 SPRAY, METERED NASAL 2 TIMES DAILY
Qty: 30 ML | Refills: 0 | Status: SHIPPED | OUTPATIENT
Start: 2021-06-23

## 2021-06-23 RX ORDER — LISINOPRIL AND HYDROCHLOROTHIAZIDE 20; 12.5 MG/1; MG/1
TABLET ORAL
COMMUNITY
Start: 2021-06-17 | End: 2021-08-03

## 2021-06-23 RX ORDER — LORATADINE 10 MG/1
10 TABLET ORAL DAILY
Qty: 30 TABLET | Refills: 2 | Status: SHIPPED | OUTPATIENT
Start: 2021-06-23 | End: 2021-09-02

## 2021-06-23 RX ORDER — BENZONATATE 200 MG/1
200 CAPSULE ORAL 3 TIMES DAILY PRN
Qty: 20 CAPSULE | Refills: 3 | Status: SHIPPED | OUTPATIENT
Start: 2021-06-23 | End: 2022-02-25 | Stop reason: ALTCHOICE

## 2021-06-23 RX ORDER — CETIRIZINE HYDROCHLORIDE 10 MG/1
10 TABLET ORAL DAILY
Qty: 30 TABLET | Refills: 3 | Status: CANCELLED | OUTPATIENT
Start: 2021-06-23

## 2021-06-23 NOTE — PROGRESS NOTES
Assessment/Plan:    Seasonal allergic rhinitis due to pollen  I am recommending patient avoid triggers  Minimize exposure to irritants like smoke, perfumes, cosmetics, hair spray, and other odors  Use of nasal saline spray and cleaning out nares as much as possible  Offered intranasal corticosteroids like azelestaine nasal spray  He can use short term claritin as his creatinine clearance is 56 and safe  To f/u if s/s do not resolve  Sore throat  -continues to have a sore throat, I am giving phenol chloraseptic spray to use every 2 hrs as needed and tea with honey  POCT strep negative  Sending culture but we discussed this being more allergy related  Cough  -Patient to practice conservative measures  Advised on strict handwashing and covering cough  I am prescribing cough medication as well  Rest and fluids  Home remedies like tea with honey/ginger, warm soups etc  OTC Tylenol also advised to take for aches and pain or fever  If s/s worsen or fail to improve after 1 week advised to return or follow up with office  Diagnoses and all orders for this visit:    Seasonal allergic rhinitis due to pollen  -     azelastine (ASTELIN) 0 1 % nasal spray; 1 spray into each nostril 2 (two) times a day Use in each nostril as directed  -     Phenol 0 5 % SOLN; Apply 1 spray to the mouth or throat every 2 (two) hours as needed (sore throat)  -     loratadine (CLARITIN) 10 mg tablet; Take 1 tablet (10 mg total) by mouth daily    Cough  -     benzonatate (TESSALON) 200 MG capsule; Take 1 capsule (200 mg total) by mouth 3 (three) times a day as needed for cough    Sore throat  -     Throat culture; Future  -     Throat culture    Other orders  -     lisinopril-hydrochlorothiazide (PRINZIDE,ZESTORETIC) 20-12 5 MG per tablet; TAKE 1 TABLET BY MOUTH DAILY NBA 1 TABLETA POR BOCA DIARIAMENTE PARA LA PRESI? N ARTERIAL  -     Cancel: cetirizine (ZyrTEC) 10 mg tablet;  Take 1 tablet (10 mg total) by mouth daily Subjective:      Patient ID: Asya Wilks is a 64 y o  male  64year old male patient here for follow up on sore throat and cough  States he was here for sore throat previously and it has been getting worse per patient  Started with a cough per patient last night  He is not taking anything for cough  Cough is intermittent and dry  He did receive already his 2 covid vaccines  Sore Throat   This is a recurrent problem  The current episode started in the past 7 days  The problem has been gradually worsening  The pain is worse on the left side  There has been no fever  The fever has been present for less than 1 day  Associated symptoms include coughing  Pertinent negatives include no congestion, diarrhea, ear pain, headaches, hoarse voice, plugged ear sensation, shortness of breath, swollen glands or trouble swallowing  Associated symptoms comments: Itchy ear left sided and sneezing  Yani Soulier He has had no exposure to strep or mono  He has tried nothing for the symptoms  The treatment provided no relief  The following portions of the patient's history were reviewed and updated as appropriate: allergies, current medications, past family history, past medical history, past social history, past surgical history and problem list     Review of Systems   Constitutional: Negative  Negative for appetite change, fatigue and fever  HENT: Positive for sneezing and sore throat  Negative for congestion, ear pain, hoarse voice, postnasal drip, rhinorrhea, trouble swallowing and voice change  Eyes: Negative  Respiratory: Positive for cough  Negative for chest tightness, shortness of breath and wheezing  Cardiovascular: Negative  Negative for chest pain and palpitations  Gastrointestinal: Negative  Negative for abdominal distention and diarrhea  Endocrine: Negative  Genitourinary: Negative  Negative for difficulty urinating  Musculoskeletal: Negative  Negative for arthralgias  Skin: Negative  Negative for color change and rash  Allergic/Immunologic: Positive for environmental allergies  Neurological: Negative  Negative for dizziness and headaches  Hematological: Negative  Does not bruise/bleed easily  Psychiatric/Behavioral: Negative  Objective:      /81 (BP Location: Left arm, Patient Position: Sitting, Cuff Size: Standard)   Pulse 93   Temp 98 3 °F (36 8 °C) (Temporal)   Resp 18   Ht 5' 8" (1 727 m)   Wt 90 7 kg (200 lb)   SpO2 97%   BMI 30 41 kg/m²          Physical Exam  Vitals and nursing note reviewed  Constitutional:       Appearance: Normal appearance  He is obese  HENT:      Head: Normocephalic and atraumatic  Right Ear: External ear normal       Left Ear: External ear normal       Nose: Nose normal  No congestion or rhinorrhea  Mouth/Throat:      Pharynx: Oropharynx is clear  Posterior oropharyngeal erythema present  Eyes:      Conjunctiva/sclera: Conjunctivae normal    Cardiovascular:      Rate and Rhythm: Normal rate and regular rhythm  Pulses: Normal pulses  Heart sounds: Normal heart sounds  Pulmonary:      Effort: Pulmonary effort is normal       Breath sounds: Normal breath sounds  Musculoskeletal:         General: Normal range of motion  Cervical back: Normal range of motion and neck supple  Skin:     General: Skin is warm and dry  Capillary Refill: Capillary refill takes less than 2 seconds  Neurological:      General: No focal deficit present  Mental Status: He is alert and oriented to person, place, and time     Psychiatric:         Mood and Affect: Mood normal          Behavior: Behavior normal            Chief Complaint   Patient presents with    Sore Throat

## 2021-06-23 NOTE — ASSESSMENT & PLAN NOTE
I am recommending patient avoid triggers  Minimize exposure to irritants like smoke, perfumes, cosmetics, hair spray, and other odors  Use of nasal saline spray and cleaning out nares as much as possible  Offered intranasal corticosteroids like azelestaine nasal spray  He can use short term claritin as his creatinine clearance is 56 and safe  To f/u if s/s do not resolve

## 2021-06-23 NOTE — ASSESSMENT & PLAN NOTE
-continues to have a sore throat, I am giving phenol chloraseptic spray to use every 2 hrs as needed and tea with honey  POCT strep negative  Sending culture but we discussed this being more allergy related

## 2021-06-23 NOTE — ASSESSMENT & PLAN NOTE
-Patient to practice conservative measures  Advised on strict handwashing and covering cough  I am prescribing cough medication as well  Rest and fluids  Home remedies like tea with honey/luz, warm soups etc  OTC Tylenol also advised to take for aches and pain or fever  If s/s worsen or fail to improve after 1 week advised to return or follow up with office

## 2021-06-25 LAB — BACTERIA THROAT CULT: NORMAL

## 2021-08-03 ENCOUNTER — OFFICE VISIT (OUTPATIENT)
Dept: FAMILY MEDICINE CLINIC | Facility: CLINIC | Age: 61
End: 2021-08-03
Payer: COMMERCIAL

## 2021-08-03 VITALS
RESPIRATION RATE: 18 BRPM | SYSTOLIC BLOOD PRESSURE: 160 MMHG | HEIGHT: 68 IN | DIASTOLIC BLOOD PRESSURE: 90 MMHG | HEART RATE: 89 BPM | BODY MASS INDEX: 30.46 KG/M2 | TEMPERATURE: 98.6 F | WEIGHT: 201 LBS | OXYGEN SATURATION: 98 %

## 2021-08-03 DIAGNOSIS — E78.2 MIXED HYPERLIPIDEMIA: ICD-10-CM

## 2021-08-03 DIAGNOSIS — R73.9 HYPERGLYCEMIA: ICD-10-CM

## 2021-08-03 DIAGNOSIS — N18.31 STAGE 3A CHRONIC KIDNEY DISEASE (HCC): ICD-10-CM

## 2021-08-03 DIAGNOSIS — I10 ESSENTIAL HYPERTENSION: Primary | ICD-10-CM

## 2021-08-03 PROCEDURE — 99214 OFFICE O/P EST MOD 30 MIN: CPT | Performed by: NURSE PRACTITIONER

## 2021-08-03 RX ORDER — LISINOPRIL 20 MG/1
20 TABLET ORAL DAILY
Qty: 90 TABLET | Refills: 1 | Status: SHIPPED | OUTPATIENT
Start: 2021-08-03 | End: 2021-08-17 | Stop reason: SDUPTHER

## 2021-08-03 NOTE — ASSESSMENT & PLAN NOTE
Lab Results   Component Value Date    EGFR 39 (L) 06/10/2021    EGFR 50 (L) 05/31/2021    EGFR 55 (L) 04/29/2021    CREATININE 1 85 (H) 06/10/2021    CREATININE 1 52 (H) 05/31/2021    CREATININE 1 41 04/29/2021   -Today I am ordering repeat CMP  Pt was previously advised to see nephrologist which he did not  Today I am reprinting requisition for him to schedule his appointment with specialist  Discussed importance of kidney disease potentially worsening  Pt agreed and will call to schedule a f/u

## 2021-08-03 NOTE — ASSESSMENT & PLAN NOTE
-blood pressure today not in good control  Pt admits to not taking his BP medications as he never picked them up from pharmacy  Denies chest pain/fatigue/palpitations  Today I am refilling lisinopril 20mg daily  I advised to continue to follow a low sodium diet  To f/u in 2 weeks for BP recheck with nurse  Pt in agreement with plan of care

## 2021-08-03 NOTE — PROGRESS NOTES
Assessment/Plan:    Essential hypertension  -blood pressure today not in good control  Pt admits to not taking his BP medications as he never picked them up from pharmacy  Denies chest pain/fatigue/palpitations  Today I am refilling lisinopril 20mg daily  I advised to continue to follow a low sodium diet  To f/u in 2 weeks for BP recheck with nurse  Pt in agreement with plan of care  Stage 3a chronic kidney disease (HCC)  Lab Results   Component Value Date    EGFR 39 (L) 06/10/2021    EGFR 50 (L) 05/31/2021    EGFR 55 (L) 04/29/2021    CREATININE 1 85 (H) 06/10/2021    CREATININE 1 52 (H) 05/31/2021    CREATININE 1 41 04/29/2021   -Today I am ordering repeat CMP  Pt was previously advised to see nephrologist which he did not  Today I am reprinting requisition for him to schedule his appointment with specialist  Discussed importance of kidney disease potentially worsening  Pt agreed and will call to schedule a f/u  Diagnoses and all orders for this visit:    Essential hypertension  -     lisinopril (ZESTRIL) 20 mg tablet; Take 1 tablet (20 mg total) by mouth daily    Stage 3a chronic kidney disease (HCC)    Mixed hyperlipidemia  -     Lipid panel; Future    Hyperglycemia  -     Comprehensive metabolic panel; Future          Subjective:      Patient ID: Mayela Orozco is a 64 y o  male  64year old male patient here for follow up on hypertension  Pt states he has not taken his meds as they finished and he never picked them up from the pharmacy  He admits to feeling a little off and thinking his bp was elevated  Denies chest pain/fatigue/palpitations today  Otherwise he feels well  He also did not follow up with nephrologist as previously discussed as he did not know he had to do this  We discussed that I would reprint the requisition so he can schedule his f/u appointment  Pt agreed  Hypertension  This is a chronic problem  The current episode started more than 1 year ago   The problem has been waxing and waning since onset  The problem is uncontrolled  Associated symptoms include headaches (mild)  Pertinent negatives include no chest pain, palpitations, peripheral edema or shortness of breath  There are no associated agents to hypertension  Risk factors for coronary artery disease include sedentary lifestyle, obesity, male gender and family history  Past treatments include ACE inhibitors  The current treatment provides mild improvement  Compliance problems include diet and exercise  Hypertensive end-organ damage includes kidney disease  There is no history of angina, CAD/MI, CVA or left ventricular hypertrophy  There is no history of chronic renal disease, a hypertension causing med, sleep apnea or a thyroid problem  The following portions of the patient's history were reviewed and updated as appropriate: allergies, current medications, past family history, past medical history, past social history, past surgical history and problem list     Review of Systems   Constitutional: Negative  Negative for appetite change, fatigue and fever  HENT: Negative  Negative for congestion, ear pain, postnasal drip, rhinorrhea, sore throat and voice change  Eyes: Negative  Respiratory: Negative  Negative for cough, chest tightness, shortness of breath and wheezing  Cardiovascular: Negative  Negative for chest pain and palpitations  Gastrointestinal: Negative  Negative for abdominal distention  Endocrine: Negative  Genitourinary: Negative  Negative for difficulty urinating  Musculoskeletal: Negative  Negative for arthralgias  Skin: Negative  Negative for color change and rash  Allergic/Immunologic: Negative  Neurological: Positive for headaches (mild)  Negative for dizziness  Hematological: Negative  Does not bruise/bleed easily  Psychiatric/Behavioral: Negative            Objective:      /90 (BP Location: Right arm, Patient Position: Sitting, Cuff Size: Standard)   Pulse 89 Temp 98 6 °F (37 °C) (Temporal)   Resp 18   Ht 5' 8" (1 727 m)   Wt 91 2 kg (201 lb)   SpO2 98%   BMI 30 56 kg/m²          Physical Exam  Vitals and nursing note reviewed  Constitutional:       General: He is not in acute distress  Appearance: Normal appearance  He is obese  He is not ill-appearing  HENT:      Head: Normocephalic and atraumatic  Right Ear: External ear normal       Left Ear: External ear normal       Nose: Nose normal  No congestion or rhinorrhea  Mouth/Throat:      Pharynx: Oropharynx is clear  No oropharyngeal exudate  Eyes:      Conjunctiva/sclera: Conjunctivae normal    Cardiovascular:      Rate and Rhythm: Normal rate and regular rhythm  Pulses: Normal pulses  Heart sounds: Normal heart sounds  Comments: No pedal edema  Pulmonary:      Effort: Pulmonary effort is normal  No respiratory distress  Breath sounds: Normal breath sounds  Musculoskeletal:         General: Normal range of motion  Cervical back: Normal range of motion and neck supple  Skin:     General: Skin is warm and dry  Capillary Refill: Capillary refill takes less than 2 seconds  Neurological:      General: No focal deficit present  Mental Status: He is alert and oriented to person, place, and time     Psychiatric:         Mood and Affect: Mood normal          Behavior: Behavior normal              Chief Complaint   Patient presents with    Hypertension     no medication

## 2021-08-07 ENCOUNTER — APPOINTMENT (OUTPATIENT)
Dept: LAB | Facility: HOSPITAL | Age: 61
End: 2021-08-07
Payer: COMMERCIAL

## 2021-08-07 DIAGNOSIS — R73.9 HYPERGLYCEMIA: ICD-10-CM

## 2021-08-07 DIAGNOSIS — E78.2 MIXED HYPERLIPIDEMIA: ICD-10-CM

## 2021-08-07 LAB
ALBUMIN SERPL BCP-MCNC: 4.3 G/DL (ref 3–5.2)
ALP SERPL-CCNC: 68 U/L (ref 43–122)
ALT SERPL W P-5'-P-CCNC: 23 U/L
ANION GAP SERPL CALCULATED.3IONS-SCNC: 8 MMOL/L (ref 5–14)
AST SERPL W P-5'-P-CCNC: 25 U/L (ref 17–59)
BILIRUB SERPL-MCNC: 1.37 MG/DL
BUN SERPL-MCNC: 28 MG/DL (ref 5–25)
CALCIUM SERPL-MCNC: 9.3 MG/DL (ref 8.4–10.2)
CHLORIDE SERPL-SCNC: 107 MMOL/L (ref 97–108)
CHOLEST SERPL-MCNC: 184 MG/DL
CO2 SERPL-SCNC: 26 MMOL/L (ref 22–30)
CREAT SERPL-MCNC: 1.62 MG/DL (ref 0.7–1.5)
GFR SERPL CREATININE-BSD FRML MDRD: 46 ML/MIN/1.73SQ M
GLUCOSE P FAST SERPL-MCNC: 95 MG/DL (ref 70–99)
HDLC SERPL-MCNC: 28 MG/DL
LDLC SERPL CALC-MCNC: 131 MG/DL
NONHDLC SERPL-MCNC: 156 MG/DL
POTASSIUM SERPL-SCNC: 4.6 MMOL/L (ref 3.6–5)
PROT SERPL-MCNC: 7.8 G/DL (ref 5.9–8.4)
SODIUM SERPL-SCNC: 141 MMOL/L (ref 137–147)
TRIGL SERPL-MCNC: 127 MG/DL

## 2021-08-07 PROCEDURE — 80061 LIPID PANEL: CPT

## 2021-08-07 PROCEDURE — 36415 COLL VENOUS BLD VENIPUNCTURE: CPT

## 2021-08-07 PROCEDURE — 80053 COMPREHEN METABOLIC PANEL: CPT

## 2021-08-10 ENCOUNTER — TELEPHONE (OUTPATIENT)
Dept: FAMILY MEDICINE CLINIC | Facility: CLINIC | Age: 61
End: 2021-08-10

## 2021-08-10 NOTE — TELEPHONE ENCOUNTER
Call pt LVM need to call office back labs results  ----- Message from Tariq Marrufo, [address] sent at 8/9/2021  2:39 PM EDT -----  Please call patient and inform that lab results show his kidney function is worsening  Advise to f/u with kidney specialist as we discussed

## 2021-08-17 ENCOUNTER — CLINICAL SUPPORT (OUTPATIENT)
Dept: FAMILY MEDICINE CLINIC | Facility: CLINIC | Age: 61
End: 2021-08-17

## 2021-08-17 VITALS — SYSTOLIC BLOOD PRESSURE: 138 MMHG | DIASTOLIC BLOOD PRESSURE: 72 MMHG

## 2021-08-17 DIAGNOSIS — I10 ESSENTIAL HYPERTENSION: ICD-10-CM

## 2021-08-17 DIAGNOSIS — I10 HYPERTENSION, UNSPECIFIED TYPE: Primary | ICD-10-CM

## 2021-08-17 RX ORDER — LISINOPRIL 20 MG/1
20 TABLET ORAL DAILY
Qty: 90 TABLET | Refills: 1 | Status: SHIPPED | OUTPATIENT
Start: 2021-08-17 | End: 2021-09-02

## 2021-09-02 DIAGNOSIS — I10 ESSENTIAL HYPERTENSION: ICD-10-CM

## 2021-09-02 DIAGNOSIS — J30.1 SEASONAL ALLERGIC RHINITIS DUE TO POLLEN: ICD-10-CM

## 2021-09-02 RX ORDER — LORATADINE 10 MG/1
TABLET ORAL
Qty: 30 TABLET | Refills: 2 | Status: SHIPPED | OUTPATIENT
Start: 2021-09-02

## 2021-09-02 RX ORDER — LISINOPRIL 20 MG/1
20 TABLET ORAL DAILY
Qty: 90 TABLET | Refills: 1 | Status: SHIPPED | OUTPATIENT
Start: 2021-09-02 | End: 2022-01-17

## 2022-01-17 DIAGNOSIS — I10 ESSENTIAL HYPERTENSION: ICD-10-CM

## 2022-01-17 RX ORDER — LISINOPRIL 20 MG/1
20 TABLET ORAL DAILY
Qty: 90 TABLET | Refills: 1 | Status: SHIPPED | OUTPATIENT
Start: 2022-01-17 | End: 2022-02-10

## 2022-02-10 DIAGNOSIS — N52.9 ERECTILE DYSFUNCTION, UNSPECIFIED ERECTILE DYSFUNCTION TYPE: ICD-10-CM

## 2022-02-10 NOTE — TELEPHONE ENCOUNTER
Requested medication(s) are due for refill today: Yes  Patient has already received a courtesy refill: No  Other reason request has been forwarded to provider: Patient missed appt

## 2022-02-11 RX ORDER — SILDENAFIL CITRATE 20 MG/1
40 TABLET ORAL DAILY
Qty: 60 TABLET | Refills: 3 | Status: SHIPPED | OUTPATIENT
Start: 2022-02-11

## 2022-02-25 ENCOUNTER — OFFICE VISIT (OUTPATIENT)
Dept: FAMILY MEDICINE CLINIC | Facility: CLINIC | Age: 62
End: 2022-02-25
Payer: COMMERCIAL

## 2022-02-25 VITALS
RESPIRATION RATE: 20 BRPM | HEIGHT: 68 IN | BODY MASS INDEX: 30.8 KG/M2 | HEART RATE: 87 BPM | DIASTOLIC BLOOD PRESSURE: 88 MMHG | OXYGEN SATURATION: 97 % | SYSTOLIC BLOOD PRESSURE: 130 MMHG | WEIGHT: 203.2 LBS | TEMPERATURE: 98.9 F

## 2022-02-25 DIAGNOSIS — Z12.11 SCREENING FOR COLORECTAL CANCER: ICD-10-CM

## 2022-02-25 DIAGNOSIS — E78.2 MIXED HYPERLIPIDEMIA: ICD-10-CM

## 2022-02-25 DIAGNOSIS — Z12.12 SCREENING FOR COLORECTAL CANCER: ICD-10-CM

## 2022-02-25 DIAGNOSIS — R73.03 PRE-DIABETES: ICD-10-CM

## 2022-02-25 DIAGNOSIS — N18.31 STAGE 3A CHRONIC KIDNEY DISEASE (HCC): ICD-10-CM

## 2022-02-25 DIAGNOSIS — Z00.00 ANNUAL PHYSICAL EXAM: Primary | ICD-10-CM

## 2022-02-25 DIAGNOSIS — I10 ESSENTIAL HYPERTENSION: ICD-10-CM

## 2022-02-25 PROBLEM — N20.0 RENAL CALCULI: Status: RESOLVED | Noted: 2021-06-10 | Resolved: 2022-02-25

## 2022-02-25 PROBLEM — Z23 NEEDS FLU SHOT: Status: RESOLVED | Noted: 2020-12-03 | Resolved: 2022-02-25

## 2022-02-25 PROBLEM — J02.9 SORE THROAT: Status: RESOLVED | Noted: 2021-06-10 | Resolved: 2022-02-25

## 2022-02-25 PROBLEM — R73.9 HYPERGLYCEMIA: Status: RESOLVED | Noted: 2020-12-03 | Resolved: 2022-02-25

## 2022-02-25 PROBLEM — R05.9 COUGH: Status: RESOLVED | Noted: 2021-06-23 | Resolved: 2022-02-25

## 2022-02-25 PROBLEM — Z00.01 ENCOUNTER FOR GENERAL ADULT MEDICAL EXAMINATION WITH ABNORMAL FINDINGS: Status: RESOLVED | Noted: 2020-12-03 | Resolved: 2022-02-25

## 2022-02-25 PROBLEM — R53.83 OTHER FATIGUE: Status: RESOLVED | Noted: 2020-12-03 | Resolved: 2022-02-25

## 2022-02-25 PROCEDURE — 99396 PREV VISIT EST AGE 40-64: CPT | Performed by: PHYSICIAN ASSISTANT

## 2022-02-25 RX ORDER — LISINOPRIL 20 MG/1
20 TABLET ORAL DAILY
Qty: 90 TABLET | Refills: 2 | Status: SHIPPED | OUTPATIENT
Start: 2022-02-25 | End: 2022-04-02

## 2022-02-25 NOTE — PATIENT INSTRUCTIONS

## 2022-02-25 NOTE — PROGRESS NOTES
ADULT ANNUAL PHYSICAL   [facility] PRIMARY CARE [facility]    NAME: Rianna Vela  AGE: 63 y o  SEX: male  : 1960     DATE: 2022     Assessment and Plan:     Problem List Items Addressed This Visit        Cardiovascular and Mediastinum    Essential hypertension     Stable  Continue current treatment  Refills sent to pharmacy  Relevant Medications    lisinopril (ZESTRIL) 20 mg tablet       Genitourinary    Stage 3a chronic kidney disease (HCC)    Relevant Orders    Comprehensive metabolic panel       Other    Mixed hyperlipidemia    Relevant Orders    Lipid panel    Pre-diabetes    Relevant Orders    HEMOGLOBIN A1C W/ EAG ESTIMATION      Other Visit Diagnoses     Annual physical exam    -  Primary    Screening for colorectal cancer        Relevant Orders    Ambulatory Referral to General Surgery          Immunizations and preventive care screenings were discussed with patient today  Appropriate education was printed on patient's after visit summary  Counseling:  Alcohol/drug use: discussed moderation in alcohol intake, the recommendations for healthy alcohol use, and avoidance of illicit drug use  Dental Health: discussed importance of regular tooth brushing, flossing, and dental visits  Injury prevention: discussed safety/seat belts, safety helmets, smoke detectors, carbon dioxide detectors, and smoking near bedding or upholstery  Sexual health: discussed sexually transmitted diseases, partner selection, use of condoms, avoidance of unintended pregnancy, and contraceptive alternatives  · Exercise: the importance of regular exercise/physical activity was discussed  Recommend exercise 3-5 times per week for at least 30 minutes  BMI Counseling: Body mass index is 30 9 kg/m²   The BMI is above normal  Nutrition recommendations include encouraging healthy choices of fruits and vegetables, consuming healthier snacks and moderation in carbohydrate intake  Exercise recommendations include exercising 3-5 times per week  Rationale for BMI follow-up plan is due to patient being overweight or obese  Return in 6 months (on 8/25/2022) for Recheck  Chief Complaint:     Chief Complaint   Patient presents with    Physical Exam      History of Present Illness:     Adult Annual Physical   Patient here for a comprehensive physical exam  The patient reports no problems  Pt is Citizen of [place] speaking only, audio  [id number] provided interpretive services throughout encounter  Diet and Physical Activity  · Diet/Nutrition: well balanced diet  · Exercise: walking  Depression Screening  PHQ-2/9 Depression Screening    Little interest or pleasure in doing things: 0 - not at all  Feeling down, depressed, or hopeless: 0 - not at all  PHQ-2 Score: 0  PHQ-2 Interpretation: Negative depression screen       General Health  · Sleep: sleeps well  · Hearing: normal - bilateral   · Vision: no vision problems  · Dental: regular dental visits   Health  · Symptoms include: none     Review of Systems:     Review of Systems   Constitutional: Negative for chills, fever and unexpected weight change  HENT: Negative for congestion, rhinorrhea and sore throat  Eyes: Negative for visual disturbance  Respiratory: Negative for cough and shortness of breath  Cardiovascular: Negative for chest pain, palpitations and leg swelling  Gastrointestinal: Negative for abdominal pain, constipation, diarrhea, nausea and vomiting  Genitourinary: Negative for dysuria  Musculoskeletal: Negative for arthralgias and neck pain  Neurological: Negative for syncope and headaches  Psychiatric/Behavioral: The patient is not nervous/anxious         Past Medical History:     Past Medical History:   Diagnosis Date    Chronic kidney disease     Enlarged prostate     Hypertension     Kidney stone     Osteopenia     Pre-diabetes       Past Surgical History:     Past Surgical History:   Procedure Laterality Date    HERNIA REPAIR        Family History:     History reviewed  No pertinent family history  Social History:     Social History     Socioeconomic History    Marital status: /Civil Union     Spouse name: None    Number of children: None    Years of education: None    Highest education level: None   Occupational History    None   Tobacco Use    Smoking status: Never Smoker    Smokeless tobacco: Never Used   Vaping Use    Vaping Use: Never used   Substance and Sexual Activity    Alcohol use: Not Currently    Drug use: Not Currently    Sexual activity: Not Currently     Partners: Female     Birth control/protection: None   Other Topics Concern    None   Social History Narrative    None     Social Determinants of Health     Financial Resource Strain: Not on file   Food Insecurity: Not on file   Transportation Needs: Not on file   Physical Activity: Not on file   Stress: Not on file   Social Connections: Not on file   Intimate Partner Violence: Not on file   Housing Stability: Not on file      Current Medications:     Current Outpatient Medications   Medication Sig Dispense Refill    atorvastatin (LIPITOR) 20 mg tablet Take 1 tablet (20 mg total) by mouth daily 90 tablet 3    azelastine (ASTELIN) 0 1 % nasal spray 1 spray into each nostril 2 (two) times a day Use in each nostril as directed 30 mL 0    lisinopril (ZESTRIL) 20 mg tablet Take 1 tablet (20 mg total) by mouth daily 90 tablet 2    loratadine (CLARITIN) 10 mg tablet TAKE ONE TABLET BY MOUTH DAILYTOMAR 1 TABLETA POR VIA ORAL DIARIAMENTE 30 tablet 2    sildenafil (REVATIO) 20 mg tablet TAKE 2 TABLETS (40 MG TOTAL) BY MOUTH DAILY 60 tablet 3     No current facility-administered medications for this visit        Allergies:     No Known Allergies   Physical Exam:     /88 (BP Location: Left arm, Patient Position: Sitting, Cuff Size: Standard)   Pulse 87   Temp 98 9 °F (37 2 °C) (Temporal)   Resp 20   Ht 5' 8" (1 727 m)   Wt 92 2 kg (203 lb 3 2 oz)   SpO2 97%   BMI 30 90 kg/m²     Physical Exam  Vitals and nursing note reviewed  Constitutional:       General: He is not in acute distress  Appearance: Normal appearance  He is well-developed  He is obese  He is not toxic-appearing  HENT:      Head: Normocephalic and atraumatic  Right Ear: Tympanic membrane, ear canal and external ear normal       Left Ear: Tympanic membrane, ear canal and external ear normal       Nose: Nose normal       Mouth/Throat:      Mouth: Mucous membranes are moist       Pharynx: Oropharynx is clear  Eyes:      Extraocular Movements: Extraocular movements intact  Conjunctiva/sclera: Conjunctivae normal       Pupils: Pupils are equal, round, and reactive to light  Neck:      Vascular: No carotid bruit  Cardiovascular:      Rate and Rhythm: Normal rate and regular rhythm  Pulses: Normal pulses  Heart sounds: Normal heart sounds  No murmur heard  No friction rub  No gallop  Pulmonary:      Effort: Pulmonary effort is normal  No respiratory distress  Breath sounds: Normal breath sounds  No stridor  No wheezing, rhonchi or rales  Abdominal:      General: Bowel sounds are normal  There is no distension  Palpations: Abdomen is soft  There is no mass  Tenderness: There is no abdominal tenderness  There is no guarding  Musculoskeletal:         General: Normal range of motion  Cervical back: Normal range of motion and neck supple  No rigidity or tenderness  No muscular tenderness  Right lower leg: No edema  Left lower leg: No edema  Lymphadenopathy:      Cervical: No cervical adenopathy  Skin:     General: Skin is warm and dry  Neurological:      Mental Status: He is alert and oriented to person, place, and time        Gait: Gait normal    Psychiatric:         Mood and Affect: Mood normal          Behavior: Behavior normal  Thought Content:  Thought content normal            Minal Lozano PA-C  [address]

## 2022-02-26 ENCOUNTER — APPOINTMENT (OUTPATIENT)
Dept: LAB | Facility: HOSPITAL | Age: 62
End: 2022-02-26
Payer: COMMERCIAL

## 2022-02-26 DIAGNOSIS — E78.2 MIXED HYPERLIPIDEMIA: ICD-10-CM

## 2022-02-26 DIAGNOSIS — N18.31 STAGE 3A CHRONIC KIDNEY DISEASE (HCC): ICD-10-CM

## 2022-02-26 DIAGNOSIS — R73.03 PRE-DIABETES: ICD-10-CM

## 2022-02-26 LAB
ALBUMIN SERPL BCP-MCNC: 4.1 G/DL (ref 3–5.2)
ALP SERPL-CCNC: 79 U/L (ref 43–122)
ALT SERPL W P-5'-P-CCNC: 15 U/L
ANION GAP SERPL CALCULATED.3IONS-SCNC: 6 MMOL/L (ref 5–14)
AST SERPL W P-5'-P-CCNC: 23 U/L (ref 17–59)
BILIRUB SERPL-MCNC: 0.76 MG/DL
BUN SERPL-MCNC: 19 MG/DL (ref 5–25)
CALCIUM SERPL-MCNC: 8.6 MG/DL (ref 8.4–10.2)
CHLORIDE SERPL-SCNC: 108 MMOL/L (ref 97–108)
CHOLEST SERPL-MCNC: 201 MG/DL
CO2 SERPL-SCNC: 26 MMOL/L (ref 22–30)
CREAT SERPL-MCNC: 1.44 MG/DL (ref 0.7–1.5)
EST. AVERAGE GLUCOSE BLD GHB EST-MCNC: 108 MG/DL
GFR SERPL CREATININE-BSD FRML MDRD: 52 ML/MIN/1.73SQ M
GLUCOSE P FAST SERPL-MCNC: 98 MG/DL (ref 70–99)
HBA1C MFR BLD: 5.4 %
HDLC SERPL-MCNC: 30 MG/DL
LDLC SERPL CALC-MCNC: 146 MG/DL
NONHDLC SERPL-MCNC: 171 MG/DL
POTASSIUM SERPL-SCNC: 4.2 MMOL/L (ref 3.6–5)
PROT SERPL-MCNC: 8 G/DL (ref 5.9–8.4)
SODIUM SERPL-SCNC: 140 MMOL/L (ref 137–147)
TRIGL SERPL-MCNC: 124 MG/DL

## 2022-02-26 PROCEDURE — 80053 COMPREHEN METABOLIC PANEL: CPT

## 2022-02-26 PROCEDURE — 36415 COLL VENOUS BLD VENIPUNCTURE: CPT

## 2022-02-26 PROCEDURE — 83036 HEMOGLOBIN GLYCOSYLATED A1C: CPT

## 2022-02-26 PROCEDURE — 80061 LIPID PANEL: CPT

## 2022-02-28 DIAGNOSIS — I10 ESSENTIAL HYPERTENSION: ICD-10-CM

## 2022-02-28 DIAGNOSIS — E78.2 MIXED HYPERLIPIDEMIA: Primary | ICD-10-CM

## 2022-02-28 RX ORDER — ATORVASTATIN CALCIUM 40 MG/1
40 TABLET, FILM COATED ORAL DAILY
Qty: 90 TABLET | Refills: 3 | Status: SHIPPED | OUTPATIENT
Start: 2022-02-28

## 2022-03-01 DIAGNOSIS — I10 ESSENTIAL HYPERTENSION: Primary | ICD-10-CM

## 2022-03-03 RX ORDER — ATORVASTATIN CALCIUM 20 MG/1
TABLET, FILM COATED ORAL
Qty: 90 TABLET | Refills: 3 | Status: SHIPPED | OUTPATIENT
Start: 2022-03-03

## 2022-03-30 DIAGNOSIS — I10 ESSENTIAL HYPERTENSION: ICD-10-CM

## 2022-04-02 RX ORDER — LISINOPRIL 20 MG/1
20 TABLET ORAL DAILY
Qty: 90 TABLET | Refills: 2 | Status: SHIPPED | OUTPATIENT
Start: 2022-04-02 | End: 2022-05-10

## 2022-04-06 ENCOUNTER — ANESTHESIA (OUTPATIENT)
Dept: GASTROENTEROLOGY | Facility: HOSPITAL | Age: 62
End: 2022-04-06

## 2022-04-06 ENCOUNTER — HOSPITAL ENCOUNTER (OUTPATIENT)
Dept: GASTROENTEROLOGY | Facility: HOSPITAL | Age: 62
Setting detail: OUTPATIENT SURGERY
Discharge: HOME | End: 2022-04-06
Attending: SURGERY
Payer: COMMERCIAL

## 2022-04-06 ENCOUNTER — ANESTHESIA EVENT (OUTPATIENT)
Dept: GASTROENTEROLOGY | Facility: HOSPITAL | Age: 62
End: 2022-04-06

## 2022-04-06 VITALS
DIASTOLIC BLOOD PRESSURE: 84 MMHG | HEART RATE: 52 BPM | TEMPERATURE: 96.4 F | RESPIRATION RATE: 18 BRPM | SYSTOLIC BLOOD PRESSURE: 136 MMHG | OXYGEN SATURATION: 100 %

## 2022-04-06 DIAGNOSIS — Z12.11 ENCOUNTER FOR SCREENING FOR MALIGNANT NEOPLASM OF COLON: ICD-10-CM

## 2022-04-06 RX ORDER — SODIUM CHLORIDE 9 MG/ML
100 INJECTION, SOLUTION INTRAVENOUS CONTINUOUS
Status: DISCONTINUED | OUTPATIENT
Start: 2022-04-06 | End: 2022-04-10 | Stop reason: HOSPADM

## 2022-04-06 RX ORDER — PROPOFOL 10 MG/ML
INJECTION, EMULSION INTRAVENOUS AS NEEDED
Status: DISCONTINUED | OUTPATIENT
Start: 2022-04-06 | End: 2022-04-06

## 2022-04-06 RX ORDER — LIDOCAINE HYDROCHLORIDE 10 MG/ML
INJECTION, SOLUTION EPIDURAL; INFILTRATION; INTRACAUDAL; PERINEURAL AS NEEDED
Status: DISCONTINUED | OUTPATIENT
Start: 2022-04-06 | End: 2022-04-06

## 2022-04-06 RX ADMIN — PROPOFOL 20 MG: 10 INJECTION, EMULSION INTRAVENOUS at 08:37

## 2022-04-06 RX ADMIN — PROPOFOL 130 MG: 10 INJECTION, EMULSION INTRAVENOUS at 08:36

## 2022-04-06 RX ADMIN — LIDOCAINE HYDROCHLORIDE 50 MG: 10 INJECTION, SOLUTION EPIDURAL; INFILTRATION; INTRACAUDAL; PERINEURAL at 08:36

## 2022-04-06 RX ADMIN — PROPOFOL 50 MG: 10 INJECTION, EMULSION INTRAVENOUS at 08:43

## 2022-04-06 RX ADMIN — SODIUM CHLORIDE 100 ML/HR: 0.9 INJECTION, SOLUTION INTRAVENOUS at 08:23

## 2022-04-06 RX ADMIN — PROPOFOL 50 MG: 10 INJECTION, EMULSION INTRAVENOUS at 08:49

## 2022-04-06 RX ADMIN — PROPOFOL 50 MG: 10 INJECTION, EMULSION INTRAVENOUS at 08:44

## 2022-04-06 RX ADMIN — PROPOFOL 50 MG: 10 INJECTION, EMULSION INTRAVENOUS at 08:41

## 2022-04-06 NOTE — ANESTHESIA POSTPROCEDURE EVALUATION
Post-Op Assessment Note    CV Status:  Stable  Pain Score: 0    Pain management: adequate     Mental Status:  Alert and awake   Hydration Status:  Euvolemic   PONV Controlled:  Controlled   Airway Patency:  Patent      Post Op Vitals Reviewed: Yes      Staff: CRNA         No complications documented      BP   89/57   Temp     Pulse 74   Resp 16   SpO2 97

## 2022-04-06 NOTE — ANESTHESIA PREPROCEDURE EVALUATION
Procedure:  COLONOSCOPY    Relevant Problems   CARDIO   (+) Essential hypertension   (+) Mixed hyperlipidemia      /RENAL   (+) Stage 3a chronic kidney disease ([facility] [address] )      Other   (+) Direct inguinal hernia   (+) Pre-diabetes   (+) Seasonal allergic rhinitis due to pollen        Physical Exam    Airway    Mallampati score: II  TM Distance: >3 FB  Neck ROM: full     Dental   No notable dental hx     Cardiovascular      Pulmonary      Other Findings        Anesthesia Plan  ASA Score- 2     Anesthesia Type- IV sedation with anesthesia with ASA Monitors  Additional Monitors:   Airway Plan:           Plan Factors-Exercise tolerance (METS): >4 METS  Chart reviewed  Patient summary reviewed  Patient is not a current smoker  Patient did not smoke on day of surgery  Induction- intravenous  Postoperative Plan-     Informed Consent- Anesthetic plan and risks discussed with patient  I personally reviewed this patient with the CRNA  Discussed and agreed on the Anesthesia Plan with the CRNA  Johanna Garcia

## 2022-04-06 NOTE — INTERVAL H&P NOTE
H&P reviewed  After examining the patient I find no changes in the patients condition since the H&P had been written      Vitals:    04/06/22 0727   BP: 131/93   Pulse: 77   Resp: 20   Temp: (!) 96 2 °F (35 7 °C)   SpO2: 97%

## 2022-04-06 NOTE — DISCHARGE INSTRUCTIONS
See Dr Mariia Cook on Thursday April 14, 2022 at 11:00 a m   In the morning    Resume usual activities

## 2022-04-06 NOTE — H&P
H&P Exam - General Surgery   Rianna Vela 63 y o  male MRN: [id number]  Unit/Bed#:  Encounter: [id number]    Assessment/Plan     Assessment:  Screening colonoscopy  Plan:  Colonoscopy    History of Present Illness   History, ROS and PFSH unobtainable from any source due to none  HPI:  Meghan Aden is a 63 y o  male who presents with no complaints  Patient presents for his 1st colonoscopy  Review of Systems   All other systems reviewed and are negative        Historical Information   Past Medical History:   Diagnosis Date    Chronic kidney disease     Enlarged prostate     Hypertension     Kidney stone     Osteopenia     Pre-diabetes      Past Surgical History:   Procedure Laterality Date    HERNIA REPAIR       Social History   Social History     Substance and Sexual Activity   Alcohol Use Not Currently     Social History     Substance and Sexual Activity   Drug Use Not Currently     Social History     Tobacco Use   Smoking Status Never Smoker   Smokeless Tobacco Never Used     E-Cigarette/Vaping    E-Cigarette Use Never User      E-Cigarette/Vaping Substances     Family History: non-contributory    Meds/Allergies   all medications and allergies reviewed  No Known Allergies    Objective   First Vitals:   Blood Pressure: 131/93 (04/06/22 0727)  Pulse: 77 (04/06/22 0727)  Temperature: (!) 96 2 °F (35 7 °C) (04/06/22 0727)  Temp Source: Temporal (04/06/22 0727)  Respirations: 20 (04/06/22 0727)  SpO2: 97 % (04/06/22 0727)    Current Vitals:   Blood Pressure: 131/93 (04/06/22 0727)  Pulse: 77 (04/06/22 0727)  Temperature: (!) 96 2 °F (35 7 °C) (04/06/22 0727)  Temp Source: Temporal (04/06/22 0727)  Respirations: 20 (04/06/22 0727)  SpO2: 97 % (04/06/22 0727)    No intake or output data in the 24 hours ending 04/06/22 0830    Invasive Devices  Report    Peripheral Intravenous Line            Peripheral IV 05/31/21 Right Antecubital 309 days    Peripheral IV 04/06/22 Right Antecubital <1 day Physical Exam  Vitals reviewed  Constitutional:       Appearance: Normal appearance  Cardiovascular:      Heart sounds: Normal heart sounds  Pulmonary:      Breath sounds: Normal breath sounds  Abdominal:      Palpations: Abdomen is soft  Tenderness: There is no abdominal tenderness  Musculoskeletal:         General: Normal range of motion  Cervical back: Normal range of motion  Skin:     General: Skin is warm and dry  Neurological:      General: No focal deficit present  Mental Status: He is alert and oriented to person, place, and time  Psychiatric:         Mood and Affect: Mood normal          Behavior: Behavior normal          Thought Content: Thought content normal          Judgment: Judgment normal          Lab Results: I have personally reviewed pertinent lab results  Imaging: I have personally reviewed pertinent reports  EKG, Pathology, and Other Studies: I have personally reviewed pertinent reports  Code Status: No Order  Advance Directive and Living Will:      Power of :    POLST:      Counseling / Coordination of Care  Total floor / unit time spent today 20 minutes  Greater than 50% of total time was spent with the patient and / or family counseling and / or coordination of care  A description of the counseling / coordination of care:  Colonoscopy

## 2022-05-09 DIAGNOSIS — I10 ESSENTIAL HYPERTENSION: ICD-10-CM

## 2022-05-10 RX ORDER — LISINOPRIL 20 MG/1
20 TABLET ORAL DAILY
Qty: 90 TABLET | Refills: 2 | Status: SHIPPED | OUTPATIENT
Start: 2022-05-10

## 2022-10-25 ENCOUNTER — OFFICE VISIT (OUTPATIENT)
Dept: FAMILY MEDICINE CLINIC | Facility: CLINIC | Age: 62
End: 2022-10-25

## 2022-10-25 VITALS
RESPIRATION RATE: 20 BRPM | WEIGHT: 198 LBS | HEART RATE: 88 BPM | BODY MASS INDEX: 30.01 KG/M2 | SYSTOLIC BLOOD PRESSURE: 110 MMHG | HEIGHT: 68 IN | TEMPERATURE: 97.9 F | OXYGEN SATURATION: 98 % | DIASTOLIC BLOOD PRESSURE: 70 MMHG

## 2022-10-25 DIAGNOSIS — E78.2 MIXED HYPERLIPIDEMIA: ICD-10-CM

## 2022-10-25 DIAGNOSIS — M85.80 OSTEOPENIA DETERMINED BY X-RAY: Primary | ICD-10-CM

## 2022-10-25 DIAGNOSIS — I10 ESSENTIAL HYPERTENSION: ICD-10-CM

## 2022-10-25 RX ORDER — ATORVASTATIN CALCIUM 40 MG/1
40 TABLET, FILM COATED ORAL DAILY
Qty: 90 TABLET | Refills: 3 | Status: SHIPPED | OUTPATIENT
Start: 2022-10-25

## 2022-10-25 RX ORDER — LISINOPRIL 20 MG/1
20 TABLET ORAL DAILY
Qty: 90 TABLET | Refills: 2 | Status: SHIPPED | OUTPATIENT
Start: 2022-10-25

## 2022-10-25 NOTE — PROGRESS NOTES
Name: Angely Dixon      : 1960      MRN: [id number]  Encounter Provider: Scotty Gregory MD  Encounter Date: 10/25/2022   Encounter department: [facility]   Blood pressure is well control, patient will continue same treatment  Patient understands the risks associated with HTN and the need for adequate control and adherence to therapy  Explained to patient that therapeutic measure is lifelong lifestyle modification including:  Sodium reduction (<2 g/day)  Dietary Approaches to Stop Hypertension (DASH) diet (3 servings of fruit and vegetables daily, whole grains, low sodium, low-fat proteins)   Weight loss to BMI under 30 kg/m^2  Physical activity: 3 to 5 times/week of daily aerobic exercise for 30- to 50-minute sessions as tolerated   Avoid alcohol consumption  No need for Dxa scan, Informed patient ways to improve bone density showed in X rays  The BMI is above normal  Nutrition recommendations include reducing portion sizes, decreasing overall calorie intake, 3-5 servings of fruits/vegetables daily, reducing fast food intake, consuming healthier snacks, decreasing soda and/or juice intake, moderation in carbohydrate intake, increasing intake of lean protein, reducing intake of saturated fat and trans fat, and reducing intake of cholesterol  Exercise recommendations include moderate aerobic physical activity for 150 minutes/week, vigorous aerobic physical activity for 75 minutes/week if possible, the most usual recommendation is exercising 3-5 times per week, joining a gym is a reasonable option with the precaution of the current situation, and strength training exercises are always important as we the as well  1  Osteopenia determined by x-ray    2  BMI 30 0-30 9,adult    3  Essential hypertension  -     CBC and differential; Future  -     Comprehensive metabolic panel; Future  -     Lipid panel;  Future           Subjective HPI  Review of Systems    Current Outpatient Medications on File Prior to Visit   Medication Sig   • atorvastatin (LIPITOR) 40 mg tablet Take 1 tablet (40 mg total) by mouth daily   • lisinopril (ZESTRIL) 20 mg tablet TAKE 1 TABLET (20 MG TOTAL) BY MOUTH DAILY   • sildenafil (REVATIO) 20 mg tablet TAKE 2 TABLETS (40 MG TOTAL) BY MOUTH DAILY   • [DISCONTINUED] atorvastatin (LIPITOR) 20 mg tablet TAKE 1 TABLET (20 MG TOTAL) BY MOUTH DAILY NBA 1 TABLETA POR BOCA DIARIAMENTE PARA EL COLESTEROL   • [DISCONTINUED] azelastine (ASTELIN) 0 1 % nasal spray 1 spray into each nostril 2 (two) times a day Use in each nostril as directed   • [DISCONTINUED] loratadine (CLARITIN) 10 mg tablet TAKE ONE TABLET BY MOUTH DAILYTOMAR 1 TABLETA POR VIA ORAL DIARIAMENTE       Objective     /70 (BP Location: Left arm, Patient Position: Sitting, Cuff Size: Standard)   Pulse 88   Temp 97 9 °F (36 6 °C) (Temporal)   Resp 20   Ht 5' 8" (1 727 m)   Wt 89 8 kg (198 lb)   SpO2 98%   BMI 30 11 kg/m²     Physical Exam  Marquita Jolly MD  BMI Counseling: Body mass index is 30 11 kg/m²  The BMI is above normal  Nutrition recommendations include decreasing soda and/or juice intake  Exercise recommendations include exercising 3-5 times per week

## 2022-10-29 ENCOUNTER — APPOINTMENT (OUTPATIENT)
Dept: LAB | Facility: HOSPITAL | Age: 62
End: 2022-10-29

## 2022-10-29 DIAGNOSIS — I10 ESSENTIAL HYPERTENSION: ICD-10-CM

## 2022-10-29 LAB
ALBUMIN SERPL BCP-MCNC: 4.3 G/DL (ref 3.5–5)
ALP SERPL-CCNC: 88 U/L (ref 43–122)
ALT SERPL W P-5'-P-CCNC: 22 U/L
ANION GAP SERPL CALCULATED.3IONS-SCNC: 8 MMOL/L (ref 5–14)
AST SERPL W P-5'-P-CCNC: 24 U/L (ref 17–59)
BASOPHILS # BLD AUTO: 0.03 THOUSANDS/ÂΜL (ref 0–0.1)
BASOPHILS NFR BLD AUTO: 0 % (ref 0–1)
BILIRUB SERPL-MCNC: 1.3 MG/DL (ref 0.2–1)
BUN SERPL-MCNC: 19 MG/DL (ref 5–25)
CALCIUM SERPL-MCNC: 9.2 MG/DL (ref 8.4–10.2)
CHLORIDE SERPL-SCNC: 108 MMOL/L (ref 96–108)
CHOLEST SERPL-MCNC: 122 MG/DL
CO2 SERPL-SCNC: 26 MMOL/L (ref 21–32)
CREAT SERPL-MCNC: 1.42 MG/DL (ref 0.7–1.5)
EOSINOPHIL # BLD AUTO: 0.26 THOUSAND/ÂΜL (ref 0–0.61)
EOSINOPHIL NFR BLD AUTO: 4 % (ref 0–6)
ERYTHROCYTE [DISTWIDTH] IN BLOOD BY AUTOMATED COUNT: 12.5 % (ref 11.6–15.1)
GFR SERPL CREATININE-BSD FRML MDRD: 53 ML/MIN/1.73SQ M
GLUCOSE P FAST SERPL-MCNC: 94 MG/DL (ref 70–99)
HCT VFR BLD AUTO: 44.8 % (ref 36.5–49.3)
HDLC SERPL-MCNC: 33 MG/DL
HGB BLD-MCNC: 14.7 G/DL (ref 12–17)
IMM GRANULOCYTES # BLD AUTO: 0.01 THOUSAND/UL (ref 0–0.2)
IMM GRANULOCYTES NFR BLD AUTO: 0 % (ref 0–2)
LDLC SERPL CALC-MCNC: 75 MG/DL
LYMPHOCYTES # BLD AUTO: 2.59 THOUSANDS/ÂΜL (ref 0.6–4.47)
LYMPHOCYTES NFR BLD AUTO: 38 % (ref 14–44)
MCH RBC QN AUTO: 30.8 PG (ref 26.8–34.3)
MCHC RBC AUTO-ENTMCNC: 32.8 G/DL (ref 31.4–37.4)
MCV RBC AUTO: 94 FL (ref 82–98)
MONOCYTES # BLD AUTO: 0.55 THOUSAND/ÂΜL (ref 0.17–1.22)
MONOCYTES NFR BLD AUTO: 8 % (ref 4–12)
NEUTROPHILS # BLD AUTO: 3.46 THOUSANDS/ÂΜL (ref 1.85–7.62)
NEUTS SEG NFR BLD AUTO: 50 % (ref 43–75)
NONHDLC SERPL-MCNC: 89 MG/DL
NRBC BLD AUTO-RTO: 0 /100 WBCS
PLATELET # BLD AUTO: 187 THOUSANDS/UL (ref 149–390)
PMV BLD AUTO: 11.4 FL (ref 8.9–12.7)
POTASSIUM SERPL-SCNC: 4.8 MMOL/L (ref 3.5–5.3)
PROT SERPL-MCNC: 7.9 G/DL (ref 6.4–8.4)
RBC # BLD AUTO: 4.77 MILLION/UL (ref 3.88–5.62)
SODIUM SERPL-SCNC: 142 MMOL/L (ref 135–147)
TRIGL SERPL-MCNC: 69 MG/DL
WBC # BLD AUTO: 6.9 THOUSAND/UL (ref 4.31–10.16)

## 2023-02-01 DIAGNOSIS — I10 ESSENTIAL HYPERTENSION: ICD-10-CM

## 2023-02-01 RX ORDER — LISINOPRIL 20 MG/1
20 TABLET ORAL DAILY
Qty: 90 TABLET | Refills: 2 | Status: SHIPPED | OUTPATIENT
Start: 2023-02-01

## 2023-04-27 ENCOUNTER — OFFICE VISIT (OUTPATIENT)
Dept: FAMILY MEDICINE CLINIC | Facility: CLINIC | Age: 63
End: 2023-04-27

## 2023-04-27 VITALS
RESPIRATION RATE: 20 BRPM | SYSTOLIC BLOOD PRESSURE: 110 MMHG | WEIGHT: 196 LBS | TEMPERATURE: 98 F | BODY MASS INDEX: 31.5 KG/M2 | HEIGHT: 66 IN | DIASTOLIC BLOOD PRESSURE: 70 MMHG | OXYGEN SATURATION: 98 % | HEART RATE: 92 BPM

## 2023-04-27 DIAGNOSIS — Z00.01 ENCOUNTER FOR GENERAL ADULT MEDICAL EXAMINATION WITH ABNORMAL FINDINGS: Primary | ICD-10-CM

## 2023-04-27 DIAGNOSIS — K40.90 INGUINAL HERNIA OF LEFT SIDE WITHOUT OBSTRUCTION OR GANGRENE: ICD-10-CM

## 2023-04-27 DIAGNOSIS — E78.2 MIXED HYPERLIPIDEMIA: ICD-10-CM

## 2023-04-27 DIAGNOSIS — I10 ESSENTIAL HYPERTENSION: ICD-10-CM

## 2023-04-27 DIAGNOSIS — R73.03 PRE-DIABETES: ICD-10-CM

## 2023-04-27 RX ORDER — ATORVASTATIN CALCIUM 40 MG/1
40 TABLET, FILM COATED ORAL DAILY
Qty: 90 TABLET | Refills: 3 | Status: SHIPPED | OUTPATIENT
Start: 2023-04-27

## 2023-04-27 RX ORDER — LISINOPRIL 20 MG/1
20 TABLET ORAL DAILY
Qty: 90 TABLET | Refills: 2 | Status: SHIPPED | OUTPATIENT
Start: 2023-04-27

## 2023-04-27 NOTE — PROGRESS NOTES
Name: Esha Rabago      : 1960      MRN: [id number]  Encounter Provider: Nicolas Kline MD  Encounter Date: 2023   [facility] [place] [address]     1  Encounter for general adult medical examination with abnormal findings  -     Comprehensive metabolic panel; Future  -     Lipid panel; Future    2  Pre-diabetes  -     HEMOGLOBIN A1C W/ EAG ESTIMATION; Future    3  Mixed hyperlipidemia  -     atorvastatin (LIPITOR) 40 mg tablet; Take 1 tablet (40 mg total) by mouth daily    4  Essential hypertension  -     lisinopril (ZESTRIL) 20 mg tablet; Take 1 tablet (20 mg total) by mouth daily    5  Inguinal hernia of left side without obstruction or gangrene  -     Ambulatory referral to General Surgery; Future           Subjective      HPI   Patient is here for PE, not having any acute distress  Review of Systems   Constitutional: Positive for fatigue  Negative for chills, diaphoresis and fever  HENT: Negative for hearing loss, sinus pressure, sore throat and trouble swallowing  Eyes: Negative for photophobia, pain, redness and visual disturbance  Respiratory: Negative for cough, choking, chest tightness and shortness of breath  Cardiovascular: Negative for chest pain, palpitations and leg swelling  Gastrointestinal: Positive for constipation  Negative for abdominal pain  Genitourinary: Negative for difficulty urinating, dysuria, enuresis and flank pain  Musculoskeletal: Negative for arthralgias, back pain, gait problem and joint swelling  Neurological: Negative for dizziness, facial asymmetry, light-headedness and headaches  Psychiatric/Behavioral: Negative for agitation, behavioral problems, confusion and decreased concentration         Current Outpatient Medications on File Prior to Visit   Medication Sig   • [DISCONTINUED] atorvastatin (LIPITOR) 40 mg tablet Take 1 tablet (40 mg total) by mouth daily   • [DISCONTINUED] "lisinopril (ZESTRIL) 20 mg tablet TAKE 1 TABLET (20 MG TOTAL) BY MOUTH DAILY   • sildenafil (REVATIO) 20 mg tablet TAKE 2 TABLETS (40 MG TOTAL) BY MOUTH DAILY       Objective     /70 (BP Location: Left arm, Patient Position: Sitting, Cuff Size: Standard)   Pulse 92   Temp 98 °F (36 7 °C) (Tympanic)   Resp 20   Ht 5' 6\" (1 676 m)   Wt 88 9 kg (196 lb)   SpO2 98%   BMI 31 64 kg/m²     Physical Exam  Vitals and nursing note reviewed  Neck:      Thyroid: No thyroid mass or thyromegaly  Vascular: No carotid bruit or JVD  Trachea: No tracheal tenderness  Cardiovascular:      Rate and Rhythm: Normal rate and regular rhythm  No extrasystoles are present  Pulses: Normal pulses  Heart sounds: Normal heart sounds  Heart sounds not distant  No friction rub  Pulmonary:      Effort: Pulmonary effort is normal  No tachypnea or bradypnea  Breath sounds: Normal breath sounds  No stridor  Abdominal:      General: Bowel sounds are normal  There is no abdominal bruit  Palpations: Abdomen is soft  There is no hepatomegaly or splenomegaly  Hernia: A hernia ( left inguinal ) is present  Genitourinary:     Prostate: Normal    Musculoskeletal:         General: Normal range of motion  Cervical back: No edema or rigidity  Skin:     General: Skin is warm and dry  Neurological:      Mental Status: He is oriented to person, place, and time  Deep Tendon Reflexes: Reflexes are normal and symmetric  Psychiatric:         Behavior: Behavior normal          Thought Content:  Thought content normal          Judgment: Judgment normal        Marc Ward MD  "

## 2023-05-06 ENCOUNTER — LAB (OUTPATIENT)
Dept: LAB | Facility: HOSPITAL | Age: 63
End: 2023-05-06

## 2023-05-06 DIAGNOSIS — R73.03 PRE-DIABETES: ICD-10-CM

## 2023-05-06 DIAGNOSIS — Z00.01 ENCOUNTER FOR GENERAL ADULT MEDICAL EXAMINATION WITH ABNORMAL FINDINGS: ICD-10-CM

## 2023-05-06 LAB
ALBUMIN SERPL BCP-MCNC: 4.5 G/DL (ref 3.5–5)
ALP SERPL-CCNC: 78 U/L (ref 34–104)
ALT SERPL W P-5'-P-CCNC: 21 U/L (ref 7–52)
ANION GAP SERPL CALCULATED.3IONS-SCNC: 7 MMOL/L (ref 4–13)
AST SERPL W P-5'-P-CCNC: 19 U/L (ref 13–39)
BILIRUB SERPL-MCNC: 0.77 MG/DL (ref 0.2–1)
BUN SERPL-MCNC: 24 MG/DL (ref 5–25)
CALCIUM SERPL-MCNC: 9.5 MG/DL (ref 8.4–10.2)
CHLORIDE SERPL-SCNC: 108 MMOL/L (ref 96–108)
CHOLEST SERPL-MCNC: 137 MG/DL
CO2 SERPL-SCNC: 27 MMOL/L (ref 21–32)
CREAT SERPL-MCNC: 1.7 MG/DL (ref 0.6–1.3)
GFR SERPL CREATININE-BSD FRML MDRD: 42 ML/MIN/1.73SQ M
GLUCOSE P FAST SERPL-MCNC: 99 MG/DL (ref 65–99)
HDLC SERPL-MCNC: 37 MG/DL
LDLC SERPL CALC-MCNC: 82 MG/DL (ref 0–100)
NONHDLC SERPL-MCNC: 100 MG/DL
POTASSIUM SERPL-SCNC: 4.7 MMOL/L (ref 3.5–5.3)
PROT SERPL-MCNC: 7.5 G/DL (ref 6.4–8.4)
SODIUM SERPL-SCNC: 142 MMOL/L (ref 135–147)
TRIGL SERPL-MCNC: 91 MG/DL

## 2023-05-07 LAB
EST. AVERAGE GLUCOSE BLD GHB EST-MCNC: 114 MG/DL
HBA1C MFR BLD: 5.6 %

## 2023-12-04 ENCOUNTER — OFFICE VISIT (OUTPATIENT)
Dept: FAMILY MEDICINE CLINIC | Facility: CLINIC | Age: 63
End: 2023-12-04
Payer: COMMERCIAL

## 2023-12-04 VITALS
HEART RATE: 96 BPM | WEIGHT: 193 LBS | DIASTOLIC BLOOD PRESSURE: 70 MMHG | BODY MASS INDEX: 31.02 KG/M2 | TEMPERATURE: 98 F | SYSTOLIC BLOOD PRESSURE: 110 MMHG | HEIGHT: 66 IN | OXYGEN SATURATION: 98 % | RESPIRATION RATE: 16 BRPM

## 2023-12-04 DIAGNOSIS — K59.04 CHRONIC IDIOPATHIC CONSTIPATION: ICD-10-CM

## 2023-12-04 DIAGNOSIS — N52.9 ERECTILE DYSFUNCTION, UNSPECIFIED ERECTILE DYSFUNCTION TYPE: ICD-10-CM

## 2023-12-04 DIAGNOSIS — E78.2 MIXED HYPERLIPIDEMIA: ICD-10-CM

## 2023-12-04 DIAGNOSIS — I10 ESSENTIAL HYPERTENSION: Primary | ICD-10-CM

## 2023-12-04 DIAGNOSIS — Z23 ENCOUNTER FOR IMMUNIZATION: ICD-10-CM

## 2023-12-04 PROCEDURE — 90471 IMMUNIZATION ADMIN: CPT | Performed by: FAMILY MEDICINE

## 2023-12-04 PROCEDURE — 90686 IIV4 VACC NO PRSV 0.5 ML IM: CPT | Performed by: FAMILY MEDICINE

## 2023-12-04 PROCEDURE — 99214 OFFICE O/P EST MOD 30 MIN: CPT | Performed by: FAMILY MEDICINE

## 2023-12-04 RX ORDER — SILDENAFIL CITRATE 20 MG/1
40 TABLET ORAL DAILY
Qty: 60 TABLET | Refills: 3 | Status: SHIPPED | OUTPATIENT
Start: 2023-12-04

## 2023-12-04 RX ORDER — ATORVASTATIN CALCIUM 40 MG/1
40 TABLET, FILM COATED ORAL DAILY
Qty: 90 TABLET | Refills: 3 | Status: SHIPPED | OUTPATIENT
Start: 2023-12-04

## 2023-12-04 RX ORDER — LISINOPRIL 20 MG/1
20 TABLET ORAL DAILY
Qty: 90 TABLET | Refills: 2 | Status: SHIPPED | OUTPATIENT
Start: 2023-12-04

## 2023-12-04 NOTE — PROGRESS NOTES
Caitlyn Matias      : 1960      MRN: [id number]  Encounter Provider: Gloria Mitchell MD  Encounter Date: 2023   Encounter department: [address] [place] [address]     1. Essential hypertension  -     CBC and differential; Future  -     Comprehensive metabolic panel; Future  -     lisinopril (ZESTRIL) 20 mg tablet; Take 1 tablet (20 mg total) by mouth daily    2. Mixed hyperlipidemia  -     atorvastatin (LIPITOR) 40 mg tablet; Take 1 tablet (40 mg total) by mouth daily    3. Erectile dysfunction, unspecified erectile dysfunction type  -     sildenafil (REVATIO) 20 mg tablet; Take 2 tablets (40 mg total) by mouth daily    4. Encounter for immunization  -     influenza vaccine, quadrivalent, 0.5 mL, preservative-free, for adult and pediatric patients 6 mos+ (AFLURIA, FLUARIX, FLULAVAL, FLUZONE)    5. Chronic idiopathic constipation  -     linaCLOtide 72 MCG CAPS; Take 72 mcg by mouth daily at least 30 minutes prior to the first meal of the day  -     linaCLOtide 145 MCG CAPS; Take 1 capsule (145 mcg total) by mouth daily at least 30 minutes prior to the first meal of the day  -     linaCLOtide 290 MCG CAPS; Take 1 capsule by mouth daily    6. BMI 31.0-31.9,adult      BMI Counseling: Body mass index is 31.15 kg/m². The BMI is above normal. Nutrition recommendations include reducing portion sizes and decreasing overall calorie intake. Exercise recommendations include moderate aerobic physical activity for 150 minutes/week. Chief Complaint  Follow-up for hypertension and constipation. History of Present Illness  Patient presents for routine follow-up of hypertension, currently well-controlled on 20 mg of medication, with a plan to increase to 40 mg if needed. Reports recent issues with constipation, requiring dietary changes and potential medication adjustment.     Medications  Antihypertensive 20 mg daily, with a plan to increase to 40 mg as needed. Allergies  No known drug allergies. Past Medical History  Hypertension, constipation. Social History  Non-smoker, no alcohol or illicit drug use reported. Family History  Family history of hypertension (both parents). Mother  from pacemaker complication. Had chronic constipation. Review of Systems  Positive for constipation, no other new systemic symptoms. Vital Signs  /70 (BP Location: Left arm, Patient Position: Sitting, Cuff Size: Standard)   Pulse 96   Temp 98 °F (36.7 °C) (Tympanic)   Resp 16   Ht 5' 6" (1.676 m)   Wt 87.5 kg (193 lb)   SpO2 98%   BMI 31.15 kg/m²       Physical Exam  General: Patient is alert and oriented. Cardiovascular: Regular rate and rhythm, no murmurs. Abdomen: Soft, non-distended, no palpable masses. Assessment and Plan  Hypertension: Blood pressure well-controlled on current regimen. Continue antihypertensive 20 mg daily, with consideration to increase to 40 mg if blood pressure trends higher. Schedule routine follow-up in 6 months or sooner if issues arise. Constipation: Advised on dietary modifications including increased fiber intake. Initiate trial of sample linzes,  starting with a low dose and escalating as needed. Patient to report back on efficacy of treatment. Consider further evaluation if no improvement. Additional Notes:  Patient discussed issues with bowel movements and medication adjustments for hypertension.         Oniel Sims MD   [address] [place]

## 2023-12-11 ENCOUNTER — LAB (OUTPATIENT)
Dept: LAB | Facility: HOSPITAL | Age: 63
End: 2023-12-11
Payer: COMMERCIAL

## 2023-12-11 DIAGNOSIS — I10 ESSENTIAL HYPERTENSION: ICD-10-CM

## 2023-12-11 LAB
ALBUMIN SERPL BCP-MCNC: 4.2 G/DL (ref 3.5–5)
ALP SERPL-CCNC: 79 U/L (ref 34–104)
ALT SERPL W P-5'-P-CCNC: 13 U/L (ref 7–52)
ANION GAP SERPL CALCULATED.3IONS-SCNC: 9 MMOL/L
AST SERPL W P-5'-P-CCNC: 16 U/L (ref 13–39)
BASOPHILS # BLD AUTO: 0.02 THOUSANDS/ÂΜL (ref 0–0.1)
BASOPHILS NFR BLD AUTO: 0 % (ref 0–1)
BILIRUB SERPL-MCNC: 0.69 MG/DL (ref 0.2–1)
BUN SERPL-MCNC: 21 MG/DL (ref 5–25)
CALCIUM SERPL-MCNC: 9.6 MG/DL (ref 8.4–10.2)
CHLORIDE SERPL-SCNC: 104 MMOL/L (ref 96–108)
CO2 SERPL-SCNC: 27 MMOL/L (ref 21–32)
CREAT SERPL-MCNC: 1.6 MG/DL (ref 0.6–1.3)
EOSINOPHIL # BLD AUTO: 0.13 THOUSAND/ÂΜL (ref 0–0.61)
EOSINOPHIL NFR BLD AUTO: 2 % (ref 0–6)
ERYTHROCYTE [DISTWIDTH] IN BLOOD BY AUTOMATED COUNT: 12.7 % (ref 11.6–15.1)
GFR SERPL CREATININE-BSD FRML MDRD: 45 ML/MIN/1.73SQ M
GLUCOSE P FAST SERPL-MCNC: 98 MG/DL (ref 65–99)
HCT VFR BLD AUTO: 46.8 % (ref 36.5–49.3)
HGB BLD-MCNC: 15.3 G/DL (ref 12–17)
IMM GRANULOCYTES # BLD AUTO: 0.01 THOUSAND/UL (ref 0–0.2)
IMM GRANULOCYTES NFR BLD AUTO: 0 % (ref 0–2)
LYMPHOCYTES # BLD AUTO: 2.93 THOUSANDS/ÂΜL (ref 0.6–4.47)
LYMPHOCYTES NFR BLD AUTO: 48 % (ref 14–44)
MCH RBC QN AUTO: 30.4 PG (ref 26.8–34.3)
MCHC RBC AUTO-ENTMCNC: 32.7 G/DL (ref 31.4–37.4)
MCV RBC AUTO: 93 FL (ref 82–98)
MONOCYTES # BLD AUTO: 0.45 THOUSAND/ÂΜL (ref 0.17–1.22)
MONOCYTES NFR BLD AUTO: 7 % (ref 4–12)
NEUTROPHILS # BLD AUTO: 2.63 THOUSANDS/ÂΜL (ref 1.85–7.62)
NEUTS SEG NFR BLD AUTO: 43 % (ref 43–75)
NRBC BLD AUTO-RTO: 0 /100 WBCS
PLATELET # BLD AUTO: 172 THOUSANDS/UL (ref 149–390)
PMV BLD AUTO: 11 FL (ref 8.9–12.7)
POTASSIUM SERPL-SCNC: 4.5 MMOL/L (ref 3.5–5.3)
PROT SERPL-MCNC: 7.6 G/DL (ref 6.4–8.4)
RBC # BLD AUTO: 5.04 MILLION/UL (ref 3.88–5.62)
SODIUM SERPL-SCNC: 140 MMOL/L (ref 135–147)
WBC # BLD AUTO: 6.17 THOUSAND/UL (ref 4.31–10.16)

## 2023-12-11 PROCEDURE — 80053 COMPREHEN METABOLIC PANEL: CPT

## 2023-12-11 PROCEDURE — 36415 COLL VENOUS BLD VENIPUNCTURE: CPT

## 2023-12-11 PROCEDURE — 85025 COMPLETE CBC W/AUTO DIFF WBC: CPT

## 2024-08-24 DIAGNOSIS — I10 ESSENTIAL HYPERTENSION: ICD-10-CM

## 2024-08-25 RX ORDER — LISINOPRIL 20 MG/1
20 TABLET ORAL DAILY
Qty: 90 TABLET | Refills: 1 | Status: SHIPPED | OUTPATIENT
Start: 2024-08-25

## 2024-09-16 ENCOUNTER — OFFICE VISIT (OUTPATIENT)
Dept: FAMILY MEDICINE CLINIC | Facility: CLINIC | Age: 64
End: 2024-09-16
Payer: COMMERCIAL

## 2024-09-16 VITALS
DIASTOLIC BLOOD PRESSURE: 80 MMHG | OXYGEN SATURATION: 100 % | HEART RATE: 82 BPM | HEIGHT: 66 IN | BODY MASS INDEX: 29.73 KG/M2 | TEMPERATURE: 97.9 F | RESPIRATION RATE: 16 BRPM | WEIGHT: 185 LBS | SYSTOLIC BLOOD PRESSURE: 110 MMHG

## 2024-09-16 DIAGNOSIS — N40.1 BPH WITH URINARY OBSTRUCTION: ICD-10-CM

## 2024-09-16 DIAGNOSIS — Z23 NEED FOR SHINGLES VACCINE: ICD-10-CM

## 2024-09-16 DIAGNOSIS — K59.04 CHRONIC IDIOPATHIC CONSTIPATION: ICD-10-CM

## 2024-09-16 DIAGNOSIS — N13.8 BPH WITH URINARY OBSTRUCTION: ICD-10-CM

## 2024-09-16 DIAGNOSIS — Z23 ENCOUNTER FOR IMMUNIZATION: ICD-10-CM

## 2024-09-16 DIAGNOSIS — I10 ESSENTIAL HYPERTENSION: ICD-10-CM

## 2024-09-16 DIAGNOSIS — Z00.01 ENCOUNTER FOR GENERAL ADULT MEDICAL EXAMINATION WITH ABNORMAL FINDINGS: Primary | ICD-10-CM

## 2024-09-16 PROCEDURE — 90715 TDAP VACCINE 7 YRS/> IM: CPT

## 2024-09-16 PROCEDURE — 99396 PREV VISIT EST AGE 40-64: CPT | Performed by: FAMILY MEDICINE

## 2024-09-16 PROCEDURE — 99214 OFFICE O/P EST MOD 30 MIN: CPT | Performed by: FAMILY MEDICINE

## 2024-09-16 PROCEDURE — 90471 IMMUNIZATION ADMIN: CPT

## 2024-09-16 RX ORDER — TAMSULOSIN HYDROCHLORIDE 0.4 MG/1
0.4 CAPSULE ORAL
Qty: 100 CAPSULE | Refills: 3 | Status: SHIPPED | OUTPATIENT
Start: 2024-09-16

## 2024-09-16 RX ORDER — ZOSTER VACCINE RECOMBINANT, ADJUVANTED 50 MCG/0.5
0.5 KIT INTRAMUSCULAR ONCE
Qty: 1 EACH | Refills: 1 | Status: SHIPPED | OUTPATIENT
Start: 2024-09-16 | End: 2024-09-16

## 2024-09-16 NOTE — PROGRESS NOTES
Ambulatory Visit  Name: Rianna Vela      : 1960      MRN: [id number]  Encounter Provider: Nelson Jones MD  Encounter Date: 2024   Encounter department: [facility]    Assessment & Plan  Encounter for general adult medical examination with abnormal findings  Rianna is here for a physical exam. I found her without any distress. The patient has the following chronic conditions   Patient Active Problem List   Diagnosis    Essential hypertension    Mixed hyperlipidemia    Direct inguinal hernia    Stage 3a chronic kidney disease (HCC)    Erectile dysfunction    Seasonal allergic rhinitis due to pollen    Pre-diabetes   .   I recommended exercising at least 3 1/2  hours per week and maintaining a healthy diet with low carbohydrates and saturated fat.    I gave her  information about lifestyle modification.    The patient understands the importance of an annual physical exam.      Orders:    CBC and differential; Future    Comprehensive metabolic panel; Future    Lipid Panel with Direct LDL reflex; Future    Encounter for immunization    Orders:    TDAP VACCINE GREATER THAN OR EQUAL TO 6YO IM    Need for shingles vaccine  I explained to Rianna that  CDC recommends that adults 50 years and older get two doses of the shingles vaccine called Shingrix (recombinant zoster vaccine) to prevent shingles and the complications from the disease. Adults 19 years and older who have weakened immune systems because of disease or therapy should also get two doses of Shingrix, as they have a higher risk of getting shingles and related complications.    Orders:    Zoster Vac Recomb Adjuvanted (Shingrix) 50 MCG/0.5ML SUSR; Inject 0.5 mL into a muscle once for 1 dose Repeat dose in 2 to 6 months    Essential hypertension  4. Hypertension: The patient's blood pressure is well controlled at 110/80 mmHg. Continue Lisinopril 20 mg daily. Repeat blood tests will be performed.    "    Chronic idiopathic constipation  2. Constipation: Despite using fiber, the patient continues to experience constipation. Restart Linaclotide 145 mcg daily.  3. Hyperlipidemia: Continue Atorvastatin 40 mg daily. A lipid profile will be checked today.    Orders:    linaCLOtide 145 MCG CAPS; Take 1 capsule (145 mcg total) by mouth daily at least 30 minutes prior to the first meal of the day    BPH with urinary obstruction  1. Benign Prostatic Hyperplasia (BPH): The patient has an enlarged prostate measuring 60 grams with post-void residual urine of 100 cc. Start Tamsulosin 0.4 mg to be taken nightly.    Orders:    tamsulosin (FLOMAX) 0.4 mg; Take 1 capsule (0.4 mg total) by mouth daily with dinner       History of Present Illness     The patient reports no headaches, chest pain, or pulmonary issues. He denies any stomach pain. The patient has a history of an enlarged prostate and constipation. He also mentions a family history of kidney cysts, with his mother having had them. The patient is currently taking fiber for constipation but continues to experience symptoms. He is on Atorvastatin for hyperlipidemia and Lisinopril for hypertension, both of which are well managed.        History obtained from patient.  Review of Systems  Past Medical History:   Diagnosis Date    Chronic kidney disease     Diverticulosis     Enlarged prostate     Hypertension     Kidney stone     Multiple renal cysts     Obesity     Osteopenia     Pre-diabetes      Past Surgical History:   Procedure Laterality Date    COLONOSCOPY  04/2022    diverticulosis    HERNIA REPAIR             Objective     /80 (BP Location: Left arm, Patient Position: Sitting, Cuff Size: Standard)   Pulse 82   Temp 97.9 °F (36.6 °C) (Tympanic)   Resp 16   Ht 5' 6\" (1.676 m)   Wt 83.9 kg (185 lb)   SpO2 100%   BMI 29.86 kg/m²     Physical Exam    General: The patient appears well and in no acute distress.  Cardiovascular: Heart rhythm is regular, no " murmurs detected.  Abdomen: Soft, non-tender, no masses.  Extremities: No edema noted.  Prostate: Enlarged, approximately 60 grams.  Urinary: Post-void residual urine of 100 cc.    I have spent 35 minutes with Rianna today in which greater than 50% of this time was spent in counseling/coordination of care regarding Diagnostic results, Prognosis, Risks and benefits of tx options, Counseling / Coordination of care, Reviewing / ordering tests, medicine, procedures.  Please note this time includes cumulative time on the day of encounter, including reviewing medical records and/or coordinating care among the patient's other specialists.  e

## 2024-09-16 NOTE — ASSESSMENT & PLAN NOTE
4. Hypertension: The patient's blood pressure is well controlled at 110/80 mmHg. Continue Lisinopril 20 mg daily. Repeat blood tests will be performed.

## 2024-09-21 ENCOUNTER — LAB (OUTPATIENT)
Dept: LAB | Facility: HOSPITAL | Age: 64
End: 2024-09-21
Payer: COMMERCIAL

## 2024-09-21 DIAGNOSIS — Z00.01 ENCOUNTER FOR GENERAL ADULT MEDICAL EXAMINATION WITH ABNORMAL FINDINGS: ICD-10-CM

## 2024-09-21 LAB
ALBUMIN SERPL BCG-MCNC: 4.3 G/DL (ref 3.5–5)
ALP SERPL-CCNC: 73 U/L (ref 34–104)
ALT SERPL W P-5'-P-CCNC: 13 U/L (ref 7–52)
ANION GAP SERPL CALCULATED.3IONS-SCNC: 8 MMOL/L (ref 4–13)
AST SERPL W P-5'-P-CCNC: 16 U/L (ref 13–39)
BASOPHILS # BLD AUTO: 0.02 THOUSANDS/ΜL (ref 0–0.1)
BASOPHILS NFR BLD AUTO: 0 % (ref 0–1)
BILIRUB SERPL-MCNC: 0.89 MG/DL (ref 0.2–1)
BUN SERPL-MCNC: 22 MG/DL (ref 5–25)
CALCIUM SERPL-MCNC: 9.5 MG/DL (ref 8.4–10.2)
CHLORIDE SERPL-SCNC: 106 MMOL/L (ref 96–108)
CHOLEST SERPL-MCNC: 159 MG/DL
CO2 SERPL-SCNC: 26 MMOL/L (ref 21–32)
CREAT SERPL-MCNC: 1.64 MG/DL (ref 0.6–1.3)
EOSINOPHIL # BLD AUTO: 0.24 THOUSAND/ΜL (ref 0–0.61)
EOSINOPHIL NFR BLD AUTO: 4 % (ref 0–6)
ERYTHROCYTE [DISTWIDTH] IN BLOOD BY AUTOMATED COUNT: 13 % (ref 11.6–15.1)
GFR SERPL CREATININE-BSD FRML MDRD: 44 ML/MIN/1.73SQ M
GLUCOSE P FAST SERPL-MCNC: 87 MG/DL (ref 65–99)
HCT VFR BLD AUTO: 44 % (ref 36.5–49.3)
HDLC SERPL-MCNC: 37 MG/DL
HGB BLD-MCNC: 14.5 G/DL (ref 12–17)
IMM GRANULOCYTES # BLD AUTO: 0.02 THOUSAND/UL (ref 0–0.2)
IMM GRANULOCYTES NFR BLD AUTO: 0 % (ref 0–2)
LDLC SERPL CALC-MCNC: 103 MG/DL (ref 0–100)
LYMPHOCYTES # BLD AUTO: 2.43 THOUSANDS/ΜL (ref 0.6–4.47)
LYMPHOCYTES NFR BLD AUTO: 39 % (ref 14–44)
MCH RBC QN AUTO: 30.7 PG (ref 26.8–34.3)
MCHC RBC AUTO-ENTMCNC: 33 G/DL (ref 31.4–37.4)
MCV RBC AUTO: 93 FL (ref 82–98)
MONOCYTES # BLD AUTO: 0.6 THOUSAND/ΜL (ref 0.17–1.22)
MONOCYTES NFR BLD AUTO: 10 % (ref 4–12)
NEUTROPHILS # BLD AUTO: 2.96 THOUSANDS/ΜL (ref 1.85–7.62)
NEUTS SEG NFR BLD AUTO: 47 % (ref 43–75)
NRBC BLD AUTO-RTO: 0 /100 WBCS
PLATELET # BLD AUTO: 165 THOUSANDS/UL (ref 149–390)
PMV BLD AUTO: 11.4 FL (ref 8.9–12.7)
POTASSIUM SERPL-SCNC: 4.9 MMOL/L (ref 3.5–5.3)
PROT SERPL-MCNC: 7.8 G/DL (ref 6.4–8.4)
RBC # BLD AUTO: 4.73 MILLION/UL (ref 3.88–5.62)
SODIUM SERPL-SCNC: 140 MMOL/L (ref 135–147)
TRIGL SERPL-MCNC: 95 MG/DL
WBC # BLD AUTO: 6.27 THOUSAND/UL (ref 4.31–10.16)

## 2024-09-21 PROCEDURE — 80053 COMPREHEN METABOLIC PANEL: CPT

## 2024-09-21 PROCEDURE — 36415 COLL VENOUS BLD VENIPUNCTURE: CPT

## 2024-09-21 PROCEDURE — 85025 COMPLETE CBC W/AUTO DIFF WBC: CPT

## 2024-09-21 PROCEDURE — 80061 LIPID PANEL: CPT

## 2024-09-26 NOTE — RESULT ENCOUNTER NOTE
Please call patient, the cholesterol is going back up.  Remember to take atorvastatin 40 mg every day.  The kidney function is low but stable.  Again avoid NSAID (for staff information NSIAD=non-steroidal anti inflammatory drugs) medications like Aspirin, ibuprofen, naproxen, advil, motrin, aleve.

## 2024-10-07 ENCOUNTER — APPOINTMENT (OUTPATIENT)
Dept: LAB | Facility: HOSPITAL | Age: 64
End: 2024-10-07
Payer: COMMERCIAL

## 2024-10-07 ENCOUNTER — OFFICE VISIT (OUTPATIENT)
Dept: FAMILY MEDICINE CLINIC | Facility: CLINIC | Age: 64
End: 2024-10-07
Payer: COMMERCIAL

## 2024-10-07 VITALS
DIASTOLIC BLOOD PRESSURE: 67 MMHG | TEMPERATURE: 98.7 F | SYSTOLIC BLOOD PRESSURE: 111 MMHG | HEART RATE: 77 BPM | BODY MASS INDEX: 30.86 KG/M2 | OXYGEN SATURATION: 98 % | WEIGHT: 192 LBS | HEIGHT: 66 IN | RESPIRATION RATE: 16 BRPM

## 2024-10-07 DIAGNOSIS — N40.1 BENIGN PROSTATIC HYPERPLASIA WITH INCOMPLETE BLADDER EMPTYING: ICD-10-CM

## 2024-10-07 DIAGNOSIS — R30.9 PAINFUL URINATION: Primary | ICD-10-CM

## 2024-10-07 DIAGNOSIS — R39.14 BENIGN PROSTATIC HYPERPLASIA WITH INCOMPLETE BLADDER EMPTYING: ICD-10-CM

## 2024-10-07 DIAGNOSIS — R30.9 PAINFUL URINATION: ICD-10-CM

## 2024-10-07 DIAGNOSIS — N20.0 KIDNEY STONES: ICD-10-CM

## 2024-10-07 DIAGNOSIS — N18.31 STAGE 3A CHRONIC KIDNEY DISEASE (HCC): ICD-10-CM

## 2024-10-07 PROBLEM — M85.89 OSTEOPENIA OF MULTIPLE SITES: Status: ACTIVE | Noted: 2024-10-07

## 2024-10-07 LAB
ANION GAP SERPL CALCULATED.3IONS-SCNC: 7 MMOL/L (ref 4–13)
BACTERIA UR QL AUTO: NORMAL /HPF
BILIRUB UR QL STRIP: NEGATIVE
BUN SERPL-MCNC: 29 MG/DL (ref 5–25)
CALCIUM SERPL-MCNC: 9.3 MG/DL (ref 8.4–10.2)
CHLORIDE SERPL-SCNC: 106 MMOL/L (ref 96–108)
CLARITY UR: CLEAR
CO2 SERPL-SCNC: 25 MMOL/L (ref 21–32)
COLOR UR: ABNORMAL
CREAT SERPL-MCNC: 1.75 MG/DL (ref 0.6–1.3)
GFR SERPL CREATININE-BSD FRML MDRD: 40 ML/MIN/1.73SQ M
GLUCOSE P FAST SERPL-MCNC: 104 MG/DL (ref 65–99)
GLUCOSE UR STRIP-MCNC: NEGATIVE MG/DL
HGB UR QL STRIP.AUTO: NEGATIVE
KETONES UR STRIP-MCNC: NEGATIVE MG/DL
LEUKOCYTE ESTERASE UR QL STRIP: ABNORMAL
NITRITE UR QL STRIP: NEGATIVE
NON-SQ EPI CELLS URNS QL MICRO: NORMAL /HPF
PH UR STRIP.AUTO: 5.5 [PH]
POTASSIUM SERPL-SCNC: 5.1 MMOL/L (ref 3.5–5.3)
PROT UR STRIP-MCNC: NEGATIVE MG/DL
PSA FREE MFR SERPL: 30.52 %
PSA FREE SERPL-MCNC: 1.41 NG/ML
PSA SERPL-MCNC: 4.62 NG/ML (ref 0–4)
RBC #/AREA URNS AUTO: NORMAL /HPF
SL AMB  POCT GLUCOSE, UA: ABNORMAL
SL AMB LEUKOCYTE ESTERASE,UA: ABNORMAL
SL AMB POCT BILIRUBIN,UA: ABNORMAL
SL AMB POCT BLOOD,UA: ABNORMAL
SL AMB POCT CLARITY,UA: CLEAR
SL AMB POCT COLOR,UA: ABNORMAL
SL AMB POCT KETONES,UA: ABNORMAL
SL AMB POCT NITRITE,UA: ABNORMAL
SL AMB POCT PH,UA: 5
SL AMB POCT SPECIFIC GRAVITY,UA: 1.01
SL AMB POCT URINE PROTEIN: ABNORMAL
SL AMB POCT UROBILINOGEN: ABNORMAL
SODIUM SERPL-SCNC: 138 MMOL/L (ref 135–147)
SP GR UR STRIP.AUTO: 1.01 (ref 1–1.03)
UROBILINOGEN UR STRIP-ACNC: <2 MG/DL
WBC #/AREA URNS AUTO: NORMAL /HPF

## 2024-10-07 PROCEDURE — 81002 URINALYSIS NONAUTO W/O SCOPE: CPT | Performed by: PHYSICIAN ASSISTANT

## 2024-10-07 PROCEDURE — 36415 COLL VENOUS BLD VENIPUNCTURE: CPT

## 2024-10-07 PROCEDURE — 84153 ASSAY OF PSA TOTAL: CPT

## 2024-10-07 PROCEDURE — 80048 BASIC METABOLIC PNL TOTAL CA: CPT

## 2024-10-07 PROCEDURE — 99214 OFFICE O/P EST MOD 30 MIN: CPT | Performed by: PHYSICIAN ASSISTANT

## 2024-10-07 PROCEDURE — 81001 URINALYSIS AUTO W/SCOPE: CPT | Performed by: PHYSICIAN ASSISTANT

## 2024-10-07 PROCEDURE — 84154 ASSAY OF PSA FREE: CPT

## 2024-10-07 NOTE — PROGRESS NOTES
Ambulatory Visit  Name: Rianna Vela      : 1960      MRN: [id number]  Encounter Provider: Barbara Tobar PA-C  Encounter Date: 10/7/2024   Encounter department: [facility]    Assessment & Plan  Painful urination  X 2 weeks. Described as a burning sensation after urinating. Endorses mild left flank pain but denies hematuria. POCT UA in office negative for signs of infection and blood. History of kidney stone once previously several years ago in [place]. Suspect acute bacterial prostatitis vs nephrolithiasis, more likely prostatitis considering absence of hematuria and mild flank pain. Renal function slightly decreased per CMP on , with creatinine of 1.64. Recheck kidney function with BMP. Follow up urine microscopy/culture and PsA. If PsA elevated, will treat with Bactrim for prostatitis. If non elevated, will consider non contrast CT to check for kidney stone.     Orders:    POCT urine dip    PSA, total and free; Future    Basic metabolic panel; Future    Ambulatory Referral to Urology; Future    UA w Reflex to Microscopic w Reflex to Culture; Future    UA w Reflex to Microscopic w Reflex to Culture    Benign prostatic hyperplasia with incomplete bladder emptying  Started on Tamsulosin by Dr. Jones three weeks ago for BPH after KATIE , on . Dysuria began after this, about two weeks ago. Increase Flomax from 0.4 mg to 0.8 mg daily for symptomatic relief of BPH associated symptoms. Referral placed to urology for BPH management.     Orders:    POCT urine dip    PSA, total and free; Future    Basic metabolic panel; Future    Ambulatory Referral to Urology; Future    UA w Reflex to Microscopic w Reflex to Culture; Future    UA w Reflex to Microscopic w Reflex to Culture       History of Present Illness     Rainna is a 64 year old male with PMH of BPH, hyperlipidemia, and HTN who presents today for dysuria. He states that this began 2 weeks ago. It is described as a  "burning sensation that lingers after he finishes urinating, intermittent in nature, and a 10/10 at worst. He took two pills of ampicillin last week, which he brought from , because he thought it was an infection. He has also been taking tamsulosin, which was prescribed to him for BPH, about two weeks ago. States that it is helping slightly. He endorses left flank pain and increased urinary frequency with increased water intake. Prior to this, he was waking up at night to urinate two times. Now, he wakes up about three times and is urinating more during the day. Denies hematuria, urinary incontinence, urgency. He endorse history of a kidney stone several years ago in . He is currently sexually active with one partner of several years. Denies other sexual partners or anal intercourse. He also complains of ongoing constipation. States that this is a chronic issue and that he did not  the Linzess he was prescribed from his last visit from the pharmacy.     Difficulty Urinating   Associated symptoms include frequency. Pertinent negatives include no flank pain, hematuria or urgency.         Review of Systems   Constitutional:  Negative for fever.   Respiratory:  Negative for shortness of breath.    Cardiovascular:  Negative for chest pain, palpitations and leg swelling.   Gastrointestinal:  Positive for constipation. Negative for abdominal pain and diarrhea.   Genitourinary:  Positive for dysuria and frequency. Negative for decreased urine volume, difficulty urinating, flank pain, hematuria and urgency.           Objective     /67 (BP Location: Left arm, Patient Position: Sitting, Cuff Size: Standard)   Pulse 77   Temp 98.7 °F (37.1 °C) (Temporal)   Resp 16   Ht 5' 6\" (1.676 m)   Wt 87.1 kg (192 lb)   SpO2 98%   BMI 30.99 kg/m²     Physical Exam  Constitutional:       General: He is not in acute distress.     Appearance: Normal appearance. He is not ill-appearing or toxic-appearing. "   Cardiovascular:      Rate and Rhythm: Normal rate and regular rhythm.      Heart sounds: No murmur heard.     No friction rub. No gallop.   Pulmonary:      Effort: Pulmonary effort is normal. No respiratory distress.      Breath sounds: Normal breath sounds. No stridor. No wheezing, rhonchi or rales.   Abdominal:      General: Abdomen is flat. There is no distension.      Palpations: Abdomen is soft.      Tenderness: There is no abdominal tenderness. There is left CVA tenderness (very mild). There is no right CVA tenderness.   Neurological:      Mental Status: He is alert.

## 2024-10-07 NOTE — PROGRESS NOTES
"Ambulatory Visit  Name: Rianna Vela      : 1960      MRN: [id number]  Encounter Provider: Barbara Tobar PA-C  Encounter Date: 10/7/2024   Encounter department: [facility]    Assessment & Plan  Painful urination    Orders:  •  POCT urine dip  •  PSA, total and free; Future  •  Basic metabolic panel; Future  •  Ambulatory Referral to Urology; Future  •  UA w Reflex to Microscopic w Reflex to Culture; Future    Benign prostatic hyperplasia with incomplete bladder emptying    Orders:  •  POCT urine dip  •  PSA, total and free; Future  •  Basic metabolic panel; Future  •  Ambulatory Referral to Urology; Future  •  UA w Reflex to Microscopic w Reflex to Culture; Future       History of Present Illness   {Disappearing Hyperlinks I Encounters * My Last Note * Since Last Visit * History :85856}  HPI    {History obtained from (Optional):99795}  Review of Systems  {Select to Display PMH (Optional):28851}      Objective   {Disappearing Hyperlinks   Review Vitals * Enter New Vitals * Results Review * Labs * Imaging * Cardiology * Procedures * Lung Cancer Screening * Surgical eConsent :95949}  /67 (BP Location: Left arm, Patient Position: Sitting, Cuff Size: Standard)   Pulse 77   Temp 98.7 °F (37.1 °C) (Temporal)   Resp 16   Ht 5' 6\" (1.676 m)   Wt 87.1 kg (192 lb)   SpO2 98%   BMI 30.99 kg/m²     Physical Exam  {Administrative / Billing Section (Optional):55433}  "

## 2024-10-07 NOTE — ASSESSMENT & PLAN NOTE
Lab Results   Component Value Date    EGFR 40 10/07/2024    EGFR 44 09/21/2024    EGFR 45 12/11/2023    CREATININE 1.75 (H) 10/07/2024    CREATININE 1.64 (H) 09/21/2024    CREATININE 1.60 (H) 12/11/2023     Caution with antibiotics, patient started ampicillin on his own 2 doses last week from DR. Advised against taking OTC antibiotics. Repeat BMP.

## 2024-10-07 NOTE — PROGRESS NOTES
Ambulatory Visit  Name: Rianna Vela      : 1960      MRN: [id number]  Encounter Provider: Barbara Tobar PA-C  Encounter Date: 10/7/2024   Encounter department: [facility]    Assessment & Plan  Painful urination  X2 weeks s/p KATIE with recent appointment on 24. Burning sensation when he urinates. No blood or urgency. Frequency at night 3x. No concerns for STDs, no recently sexually active. Check PSA. Normal urine dip in office, send for UA/culture. Possible prostatitis. Refer to urology for ongoing nocturia recently started on Flomax.   Orders:  •  POCT urine dip  •  PSA, total and free; Future  •  Basic metabolic panel; Future  •  Ambulatory Referral to Urology; Future  •  UA w Reflex to Microscopic w Reflex to Culture; Future  •  UA w Reflex to Microscopic w Reflex to Culture    Benign prostatic hyperplasia with incomplete bladder emptying  Sensation of incomplete emptying. Concern for obstructive uropathy with decreased GFR and CKD. Refer to urology and increase Flomax to 0.8mg at night.   Lab Results   Component Value Date    PSA 2.1 2021     Orders:  •  POCT urine dip  •  PSA, total and free; Future  •  Basic metabolic panel; Future  •  Ambulatory Referral to Urology; Future  •  UA w Reflex to Microscopic w Reflex to Culture; Future  •  UA w Reflex to Microscopic w Reflex to Culture    Stage 3a chronic kidney disease (HCC)  Lab Results   Component Value Date    EGFR 40 10/07/2024    EGFR 44 2024    EGFR 45 2023    CREATININE 1.75 (H) 10/07/2024    CREATININE 1.64 (H) 2024    CREATININE 1.60 (H) 2023     Caution with antibiotics, patient started ampicillin on his own 2 doses last week from  Advised against taking OTC antibiotics. Repeat BMP.          Kidney stones  No flank pain, no blood on dip. Do not suspect cause of current burning with urination.   Last CT renal in 2021 with following:  IMPRESSION:  1.  Bilateral  "nonobstructing renal calculi, largest measuring 9 mm on the right side and 3 mm on the left side.  No hydronephrosis or evidence of obstructive uropathy.   2.  Diffuse osteopenia.              Depression Screening and Follow-up Plan: Patient was screened for depression during today's encounter. They screened negative with a PHQ-2 score of 0.    History of Present Illness     Rianna is a 64 year old male with PMH of BPH, hyperlipidemia, and HTN who presents today for dysuria x 2 weeks. Burning sensation that lingers after he finishes urinating. He took two pills of ampicillin last week which he brought from  but it did not help. Taking Flomax per . T after getting a PVR of 100mL and KATIE done showing BPH. History of nocturia x 2 prior to this episode, now 3x waking up. Sexually active with one partner of several years. Denies other sexual partners or anal intercourse. Didn't  Linzess yet.    History was conducted in [place] without the use of .  Linh BARNES-Student was present for exam and obtained portion of history and physical.       Difficulty Urinating   Associated symptoms include frequency. Pertinent negatives include no flank pain, hematuria or urgency.         Review of Systems   Constitutional:  Negative for fever and unexpected weight change.   Respiratory:  Negative for shortness of breath.    Cardiovascular:  Negative for chest pain, palpitations and leg swelling.   Gastrointestinal:  Positive for constipation. Negative for abdominal pain and diarrhea.   Genitourinary:  Positive for dysuria and frequency. Negative for decreased urine volume, difficulty urinating, flank pain, hematuria and urgency.           Objective     /67 (BP Location: Left arm, Patient Position: Sitting, Cuff Size: Standard)   Pulse 77   Temp 98.7 °F (37.1 °C) (Temporal)   Resp 16   Ht 5' 6\" (1.676 m)   Wt 87.1 kg (192 lb)   SpO2 98%   BMI 30.99 kg/m²     Physical Exam  Vitals reviewed. "   Constitutional:       General: He is not in acute distress.     Appearance: Normal appearance. He is not ill-appearing or toxic-appearing.   HENT:      Head: Normocephalic and atraumatic.   Cardiovascular:      Rate and Rhythm: Normal rate and regular rhythm.      Heart sounds: No murmur heard.     No friction rub. No gallop.   Pulmonary:      Effort: Pulmonary effort is normal. No respiratory distress.      Breath sounds: Normal breath sounds. No stridor. No wheezing, rhonchi or rales.   Abdominal:      General: Abdomen is flat. There is no distension.      Palpations: Abdomen is soft.      Tenderness: There is no abdominal tenderness. There is left CVA tenderness (very mild). There is no right CVA tenderness.   Neurological:      Mental Status: He is alert and oriented to person, place, and time. Mental status is at baseline.

## 2024-10-07 NOTE — ASSESSMENT & PLAN NOTE
No flank pain, no blood on dip. Do not suspect cause of current burning with urination.   Last CT renal in 5/2021 with following:  IMPRESSION:  1.  Bilateral nonobstructing renal calculi, largest measuring 9 mm on the right side and 3 mm on the left side.  No hydronephrosis or evidence of obstructive uropathy.   2.  Diffuse osteopenia.

## 2024-10-09 ENCOUNTER — TELEPHONE (OUTPATIENT)
Age: 64
End: 2024-10-09

## 2024-10-09 NOTE — TELEPHONE ENCOUNTER
Patient can schedule with any urologist with that referral - please have him call  [phone] to schedule and schedule here for sooner appointment if with worsening pain with urination

## 2024-10-09 NOTE — TELEPHONE ENCOUNTER
Patient stated having trouble getting an appointment with Dr. Monteiro.  Requesting another Urologist. General referral

## 2024-10-10 DIAGNOSIS — N40.1 BENIGN PROSTATIC HYPERPLASIA WITH INCOMPLETE BLADDER EMPTYING: ICD-10-CM

## 2024-10-10 DIAGNOSIS — R30.9 PAINFUL URINATION: Primary | ICD-10-CM

## 2024-10-10 DIAGNOSIS — R39.14 BENIGN PROSTATIC HYPERPLASIA WITH INCOMPLETE BLADDER EMPTYING: ICD-10-CM

## 2024-10-11 ENCOUNTER — TELEPHONE (OUTPATIENT)
Age: 64
End: 2024-10-11

## 2024-10-11 NOTE — TELEPHONE ENCOUNTER
Patient calls the office and with the assistance of Mima Interpretor #[id number] I was able to understand that the patient needed the telephone number for the urology referral that was placed on 10/10/24. Patient was referred to [facility].  I was able to provide the patient with the telephone number [phone]. Patient had no further questions or concerns.

## 2024-11-22 ENCOUNTER — OFFICE VISIT (OUTPATIENT)
Dept: UROLOGY | Facility: CLINIC | Age: 64
End: 2024-11-22
Payer: COMMERCIAL

## 2024-11-22 VITALS
BODY MASS INDEX: 30.7 KG/M2 | SYSTOLIC BLOOD PRESSURE: 140 MMHG | HEART RATE: 67 BPM | HEIGHT: 66 IN | OXYGEN SATURATION: 98 % | DIASTOLIC BLOOD PRESSURE: 86 MMHG | WEIGHT: 191 LBS

## 2024-11-22 DIAGNOSIS — R30.9 PAINFUL URINATION: ICD-10-CM

## 2024-11-22 DIAGNOSIS — R97.20 ELEVATED PSA: ICD-10-CM

## 2024-11-22 DIAGNOSIS — R39.14 BENIGN PROSTATIC HYPERPLASIA WITH INCOMPLETE BLADDER EMPTYING: Primary | ICD-10-CM

## 2024-11-22 DIAGNOSIS — N40.1 BENIGN PROSTATIC HYPERPLASIA WITH INCOMPLETE BLADDER EMPTYING: Primary | ICD-10-CM

## 2024-11-22 LAB
POST-VOID RESIDUAL VOLUME, ML POC: 32 ML
SL AMB  POCT GLUCOSE, UA: ABNORMAL
SL AMB LEUKOCYTE ESTERASE,UA: ABNORMAL
SL AMB POCT BILIRUBIN,UA: ABNORMAL
SL AMB POCT BLOOD,UA: ABNORMAL
SL AMB POCT CLARITY,UA: CLEAR
SL AMB POCT COLOR,UA: YELLOW
SL AMB POCT KETONES,UA: ABNORMAL
SL AMB POCT NITRITE,UA: ABNORMAL
SL AMB POCT PH,UA: 5
SL AMB POCT SPECIFIC GRAVITY,UA: 1.01
SL AMB POCT URINE PROTEIN: ABNORMAL
SL AMB POCT UROBILINOGEN: 0.2

## 2024-11-22 PROCEDURE — 51798 US URINE CAPACITY MEASURE: CPT | Performed by: UROLOGY

## 2024-11-22 PROCEDURE — 99204 OFFICE O/P NEW MOD 45 MIN: CPT | Performed by: UROLOGY

## 2024-11-22 PROCEDURE — 81002 URINALYSIS NONAUTO W/O SCOPE: CPT | Performed by: UROLOGY

## 2024-11-22 NOTE — PROGRESS NOTES
Name: Rianna Vela      : 1960      MRN: [id number]  Encounter Provider: Refugio Gilmore MD  Encounter Date: 2024   Encounter department: [facility]OLOG[facility] [place] [facility]  :  Assessment & Plan  Benign prostatic hyperplasia with incomplete bladder emptying  We discussed his previous symptoms, and that alpha-blocker therapy is appropriate first-line treatment.  He is happy with tamsulosin.  Recommend continuing this medication at this time.  We discussed that if other intervention is necessary, these are surgical and the more invasive.  He is happy to continue with tamsulosin.  Orders:    Ambulatory Referral to Urology    POCT urine dip    POCT Measure PVR    Elevated PSA  Discussed his elevated PSA and concern for potential prostate cancer risk.  Recommend repeat PSA.  We discussed that sexual activity needs to be avoided for 3 days prior to the blood test.  If PSA remains elevated, we will then proceed with MRI of the prostate as per current guidelines.  If it reverts back to his normal level, may continue with annual screening.  Orders:    PSA Total, Diagnostic; Future    Painful urination    Orders:    Ambulatory Referral to Urology    POCT urine dip    POCT Measure PVR        History of Present Illness   Rianna Vela is a 64 y.o. male  feels incomplete emptying, for about 2 months with discomfort. Also complained of weak stream, hesitancy, frequency.  He has been on tamsulosin and seems better now.  No current concern.  He reports prostate was examined.    Review of records reveals PSA 4.6.    #[id number]  AUA 23.  AUA SYMPTOM SCORE      Flowsheet Row Most Recent Value   AUA SYMPTOM SCORE    How often have you had a sensation of not emptying your bladder completely after you finished urinating? 5   How often have you had to urinate again less than two hours after you finished urinating? 3   How often have you found you stopped and started again several times when you urinate? 3  "  How often have you found it difficult to postpone urination? 2   How often have you had a weak urinary stream? 4   How often have you had to push or strain to begin urination? 3   How many times did you most typically get up to urinate from the time you went to bed at night until the time you got up in the morning? 3   Quality of Life: If you were to spend the rest of your life with your urinary condition just the way it is now, how would you feel about that? 6   AUA SYMPTOM SCORE 23          Review of Systems  Past Medical History   Past Medical History:   Diagnosis Date    Chronic kidney disease     Diverticulosis     Enlarged prostate     Hypertension     Kidney stone     Multiple renal cysts     Obesity     Osteopenia     Pre-diabetes      Past Surgical History:   Procedure Laterality Date    COLONOSCOPY  04/2022    diverticulosis    HERNIA REPAIR       Family History   Problem Relation Age of Onset    Hypertension Mother     Hypertension Father       reports that he has never smoked. He has never used smokeless tobacco. He reports that he does not currently use alcohol. He reports that he does not currently use drugs.  Current Outpatient Medications on File Prior to Visit   Medication Sig Dispense Refill    atorvastatin (LIPITOR) 40 mg tablet Take 1 tablet (40 mg total) by mouth daily 90 tablet 3    linaCLOtide 145 MCG CAPS Take 1 capsule (145 mcg total) by mouth daily at least 30 minutes prior to the first meal of the day 30 capsule 5    lisinopril (ZESTRIL) 20 mg tablet TAKE 1 TABLET (20 MG TOTAL) BY MOUTH DAILY 90 tablet 1    tamsulosin (FLOMAX) 0.4 mg Take 1 capsule (0.4 mg total) by mouth daily with dinner 100 capsule 3     No current facility-administered medications on file prior to visit.   No Known Allergies        Objective   /86 (BP Location: Left arm, Patient Position: Sitting, Cuff Size: Adult)   Pulse 67   Ht 5' 6\" (1.676 m)   Wt 86.6 kg (191 lb)   SpO2 98%   BMI 30.83 kg/m² "     Physical Exam  Vitals reviewed.   Constitutional:       Appearance: He is well-developed.   HENT:      Head: Normocephalic and atraumatic.   Eyes:      Conjunctiva/sclera: Conjunctivae normal.   Cardiovascular:      Rate and Rhythm: Normal rate.   Pulmonary:      Effort: Pulmonary effort is normal.   Abdominal:      Palpations: Abdomen is soft.   Musculoskeletal:         General: Normal range of motion.   Skin:     General: Skin is warm and dry.   Neurological:      Mental Status: He is alert and oriented to person, place, and time.   Psychiatric:         Mood and Affect: Mood normal.          Results  Lab Results   Component Value Date    PSA 4.620 (H) 10/07/2024    PSA 2.1 01/30/2021     Lab Results   Component Value Date    CALCIUM 9.3 10/07/2024    K 5.1 10/07/2024    CO2 25 10/07/2024     10/07/2024    BUN 29 (H) 10/07/2024    CREATININE 1.75 (H) 10/07/2024     Lab Results   Component Value Date    WBC 6.27 09/21/2024    HGB 14.5 09/21/2024    HCT 44.0 09/21/2024    MCV 93 09/21/2024     09/21/2024       Office Urine Dip  Recent Results (from the past hour)   POCT urine dip    Collection Time: 11/22/24  9:27 AM   Result Value Ref Range    LEUKOCYTE ESTERASE,UA trace     NITRITE,UA -     SL AMB POCT UROBILINOGEN 0.2     POCT URINE PROTEIN -      PH,UA 5.0     BLOOD,UA moderate     SPECIFIC GRAVITY,UA 1.010     KETONES,UA -     BILIRUBIN,UA -     GLUCOSE, UA -      COLOR,UA yellow     CLARITY,UA clear    POCT Measure PVR    Collection Time: 11/22/24  9:28 AM   Result Value Ref Range    POST-VOID RESIDUAL VOLUME, ML POC 32 mL   ]    Administrative Statements

## 2024-11-23 ENCOUNTER — APPOINTMENT (OUTPATIENT)
Dept: LAB | Facility: HOSPITAL | Age: 64
End: 2024-11-23
Payer: COMMERCIAL

## 2024-11-23 DIAGNOSIS — R97.20 ELEVATED PSA: ICD-10-CM

## 2024-11-23 LAB — PSA SERPL-MCNC: 5.81 NG/ML (ref 0–4)

## 2024-11-23 PROCEDURE — 36415 COLL VENOUS BLD VENIPUNCTURE: CPT

## 2024-11-23 PROCEDURE — 84153 ASSAY OF PSA TOTAL: CPT

## 2024-11-25 ENCOUNTER — RESULTS FOLLOW-UP (OUTPATIENT)
Dept: UROLOGY | Facility: CLINIC | Age: 64
End: 2024-11-25

## 2024-11-25 DIAGNOSIS — R97.20 ELEVATED PSA: Primary | ICD-10-CM

## 2024-11-25 NOTE — TELEPHONE ENCOUNTER
Called patient and left a generic vm, per communication consent. Informed the pt to call the office back at [phone]. Will try calling again later.

## 2024-11-27 ENCOUNTER — TELEPHONE (OUTPATIENT)
Age: 64
End: 2024-11-27

## 2024-11-27 NOTE — TELEPHONE ENCOUNTER
Patient called stating he wants to see someone about his hernia. Ambulatory referral is now . Please place new order for Ambulatory referral to General Surgery and advise patient when this is placed.     Language Line Assistance with call   Name: Gab   ID Number: [id number]

## 2024-11-29 ENCOUNTER — OFFICE VISIT (OUTPATIENT)
Dept: FAMILY MEDICINE CLINIC | Facility: CLINIC | Age: 64
End: 2024-11-29
Payer: COMMERCIAL

## 2024-11-29 VITALS
SYSTOLIC BLOOD PRESSURE: 120 MMHG | BODY MASS INDEX: 30.53 KG/M2 | OXYGEN SATURATION: 97 % | RESPIRATION RATE: 16 BRPM | WEIGHT: 190 LBS | HEART RATE: 85 BPM | HEIGHT: 66 IN | TEMPERATURE: 98.4 F | DIASTOLIC BLOOD PRESSURE: 69 MMHG

## 2024-11-29 DIAGNOSIS — K40.90 DIRECT INGUINAL HERNIA: Primary | ICD-10-CM

## 2024-11-29 DIAGNOSIS — N40.1 BPH WITH URINARY OBSTRUCTION: ICD-10-CM

## 2024-11-29 DIAGNOSIS — N13.8 BPH WITH URINARY OBSTRUCTION: ICD-10-CM

## 2024-11-29 DIAGNOSIS — R97.20 ELEVATED PSA: ICD-10-CM

## 2024-11-29 DIAGNOSIS — K40.90 NON-RECURRENT UNILATERAL INGUINAL HERNIA WITHOUT OBSTRUCTION OR GANGRENE: Primary | ICD-10-CM

## 2024-11-29 DIAGNOSIS — I10 ESSENTIAL HYPERTENSION: ICD-10-CM

## 2024-11-29 DIAGNOSIS — E78.2 MIXED HYPERLIPIDEMIA: ICD-10-CM

## 2024-11-29 PROCEDURE — 99214 OFFICE O/P EST MOD 30 MIN: CPT | Performed by: PHYSICIAN ASSISTANT

## 2024-11-29 RX ORDER — TAMSULOSIN HYDROCHLORIDE 0.4 MG/1
0.8 CAPSULE ORAL
Qty: 100 CAPSULE | Refills: 3 | Status: SHIPPED | OUTPATIENT
Start: 2024-11-29

## 2024-11-29 NOTE — ASSESSMENT & PLAN NOTE
Lab Results   Component Value Date    PSA 5.809 (H) 11/23/2024    PSA 4.620 (H) 10/07/2024    PSA 2.1 01/30/2021     Defer MRI prostate to urology to be ordered with recent labs showing elevated PSA increased from previous. Discussed with patient to follow-up urology today. Improved with burning with urination since taking Flomax 0.8mg daily.

## 2024-11-29 NOTE — ASSESSMENT & PLAN NOTE
L sided hernia recent worsening x several weeks, previously referred to surgery but did not scheduled due to concerns for insurance payment, will start with new insurance in January, patient to schedule appointment with general surgery. Reducible on exam. Worse with heavy lifting, note for work for lifting restrictions <20lb only pending surgery. CT abd/pelvis from 2021 showed small fat-containing L inguinal hernia, has worsened since then.

## 2024-11-29 NOTE — LETTER
November 29, 2024     Patient: Rianna Vela  YOB: 1960  Date of Visit: 11/29/2024      To Whom it May Concern:    Rianna Vela is under my professional care. Rianna was seen in my office on 11/29/2024. Rianna may return to work on Monday, December 2, 2024 . Rianna may return to work with limitations of no heavy lifting of more than 10lbs pending inguinal hernia surgery to be scheduled.      If you have any questions or concerns, please don't hesitate to call.         Sincerely,          Barbara Tobar PA-C        CC: No Recipients

## 2024-11-29 NOTE — ASSESSMENT & PLAN NOTE
Stable BP on lisinopril 20mg daily.    BP Readings from Last 3 Encounters:   11/29/24 120/69   11/22/24 140/86   10/07/24 111/67

## 2024-11-29 NOTE — TELEPHONE ENCOUNTER
Pt called today and was informed regarding the referral and phone number given to schedule. Pt also asked if Dr. Tobar can write and excuse note saying he can't  more than 10 lbs because his job is having him  25 lbs and he can't do it. Please advise [phone]. Pt wants to pick it up from the office today. Thank you.

## 2024-11-29 NOTE — LETTER
November 29, 2024     Patient: Rianna Vela  YOB: 1960  Date of Visit: 11/29/2024      To Whom it May Concern:    Rianna Vela is under my professional care. Rianna was seen in my office on 11/29/2024. Rianna may return to work on Monday, December 2, 2024. Rianna may return to work with limitations of no heavy lifting of more than 20 lbs pending inguinal hernia surgery to be scheduled.    If you have any questions or concerns, please don't hesitate to call.      Sincerely,          Barbara Tobar PA-C        CC: No Recipients

## 2024-11-29 NOTE — ASSESSMENT & PLAN NOTE
Lab Results   Component Value Date    EGFR 40 10/07/2024    EGFR 44 09/21/2024    EGFR 45 12/11/2023    CREATININE 1.75 (H) 10/07/2024    CREATININE 1.64 (H) 09/21/2024    CREATININE 1.60 (H) 12/11/2023

## 2024-11-29 NOTE — PROGRESS NOTES
Name: Rianna Vela      : 1960      MRN: [id number]  Encounter Provider: Barbara Tobar PA-C  Encounter Date: 2024   Encounter department: [facility]  :  Assessment & Plan  Non-recurrent unilateral inguinal hernia without obstruction or gangrene  L sided hernia recent worsening x several weeks, previously referred to surgery but did not scheduled due to concerns for insurance payment, will start with new insurance in January, patient to schedule appointment with general surgery. Reducible on exam. Worse with heavy lifting, note for work for lifting restrictions <20lb only pending surgery. CT abd/pelvis from  showed small fat-containing L inguinal hernia, has worsened since then.          Elevated PSA  Lab Results   Component Value Date    PSA 5.809 (H) 2024    PSA 4.620 (H) 10/07/2024    PSA 2.1 2021     Defer MRI prostate to urology to be ordered with recent labs showing elevated PSA increased from previous. Discussed with patient to follow-up urology today. Improved with burning with urination since taking Flomax 0.8mg daily.          Essential hypertension  Stable BP on lisinopril 20mg daily.    BP Readings from Last 3 Encounters:   24 120/69   24 140/86   10/07/24 111/67              Mixed hyperlipidemia  Lab Results   Component Value Date    CHOLESTEROL 159 2024    CHOLESTEROL 137 2023    CHOLESTEROL 122 10/29/2022     Lab Results   Component Value Date    HDL 37 (L) 2024    HDL 37 (L) 2023    HDL 33 (L) 10/29/2022     Lab Results   Component Value Date    TRIG 95 2024    TRIG 91 2023    TRIG 69 10/29/2022     Lab Results   Component Value Date    NONHDLC 100 2023    NONHDLC 89 10/29/2022    NONHDLC 171 2022     Lab Results   Component Value Date    LDLCALC 103 (H) 2024     Continue atorvastatin 40mg daily. Recommend low fat diet.          BPH with urinary  "obstruction  Improved symptoms on Flomax, follow-up urology.   Orders:    tamsulosin (FLOMAX) 0.4 mg; Take 2 capsules (0.8 mg total) by mouth daily with dinner           History of Present Illness     Rianna is a 64 year old male with PMH of BPH, hyperlipidemia, and HTN who presents today for work note for lifting restrictions with inguinal hernia. Known hernia for years. R sided repair in the past. 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 Labs done, was not contact by urology yet.     History was conducted in [place] without the use of .      Review of Systems   Constitutional:  Negative for chills, fever and unexpected weight change.   Respiratory:  Negative for shortness of breath.    Cardiovascular:  Negative for chest pain.   Genitourinary:  Positive for dysuria (improved).          Objective   /69 (BP Location: Left arm, Patient Position: Sitting, Cuff Size: Standard)   Pulse 85   Temp 98.4 °F (36.9 °C) (Temporal)   Resp 16   Ht 5' 6\" (1.676 m)   Wt 86.2 kg (190 lb)   SpO2 97%   BMI 30.67 kg/m²      Physical Exam  Vitals reviewed. Chaperone present: deferred.   Constitutional:       Appearance: Normal appearance.   HENT:      Head: Normocephalic and atraumatic.   Cardiovascular:      Rate and Rhythm: Normal rate and regular rhythm.   Pulmonary:      Effort: Pulmonary effort is normal.      Breath sounds: Normal breath sounds.   Abdominal:      Hernia: A hernia is present. Hernia is present in the left inguinal area (reducible bulge L groin).   Neurological:      Mental Status: He is alert and oriented to person, place, and time. Mental status is at baseline.         "

## 2024-11-29 NOTE — ASSESSMENT & PLAN NOTE
Lab Results   Component Value Date    CHOLESTEROL 159 09/21/2024    CHOLESTEROL 137 05/06/2023    CHOLESTEROL 122 10/29/2022     Lab Results   Component Value Date    HDL 37 (L) 09/21/2024    HDL 37 (L) 05/06/2023    HDL 33 (L) 10/29/2022     Lab Results   Component Value Date    TRIG 95 09/21/2024    TRIG 91 05/06/2023    TRIG 69 10/29/2022     Lab Results   Component Value Date    NONHDLC 100 05/06/2023    NONHDLC 89 10/29/2022    NONHDLC 171 02/26/2022     Lab Results   Component Value Date    LDLCALC 103 (H) 09/21/2024     Continue atorvastatin 40mg daily. Recommend low fat diet.

## 2024-12-23 ENCOUNTER — CONSULT (OUTPATIENT)
Dept: SURGERY | Facility: CLINIC | Age: 64
End: 2024-12-23
Payer: COMMERCIAL

## 2024-12-23 VITALS
HEART RATE: 72 BPM | WEIGHT: 191.2 LBS | OXYGEN SATURATION: 98 % | HEIGHT: 66 IN | TEMPERATURE: 97.5 F | SYSTOLIC BLOOD PRESSURE: 120 MMHG | BODY MASS INDEX: 30.73 KG/M2 | DIASTOLIC BLOOD PRESSURE: 84 MMHG

## 2024-12-23 DIAGNOSIS — K40.90 NON-RECURRENT UNILATERAL INGUINAL HERNIA WITHOUT OBSTRUCTION OR GANGRENE: Primary | ICD-10-CM

## 2024-12-23 DIAGNOSIS — K40.90 DIRECT INGUINAL HERNIA: ICD-10-CM

## 2024-12-23 DIAGNOSIS — N18.31 STAGE 3A CHRONIC KIDNEY DISEASE (HCC): ICD-10-CM

## 2024-12-23 DIAGNOSIS — K40.90 LEFT INGUINAL HERNIA: ICD-10-CM

## 2024-12-23 PROCEDURE — 99203 OFFICE O/P NEW LOW 30 MIN: CPT

## 2024-12-23 RX ORDER — SODIUM CHLORIDE, SODIUM LACTATE, POTASSIUM CHLORIDE, CALCIUM CHLORIDE 600; 310; 30; 20 MG/100ML; MG/100ML; MG/100ML; MG/100ML
125 INJECTION, SOLUTION INTRAVENOUS CONTINUOUS
OUTPATIENT
Start: 2025-03-03

## 2024-12-23 NOTE — PROGRESS NOTES
Name: Rianna Vela      : 1960      MRN: [id number]  Encounter Provider: RICHARD Harris  Encounter Date: 2024   Encounter department: [facility] GENERAL SURGERY [place]  :  Assessment & Plan  Direct inguinal hernia  Patient presents with a left sided inguinal hernia. Left hernia was reducible with slight discomfort. No right hernia noted on examination. Patient would like to set a surgery date. States he will have insurance starting in January. Patient currently on flomax for benign prostatic hypertrophy. Has MRI scheduled for  for elevated continuous PSA. If needs to move back in relation to urologic workups, patient aware we can push surgery back. All risks and hernia reoccurrence reviewed. Patient is at a higher risk for urinary retention and understands. Discussed lifting restrictions for post surgery. All questions asked and answered.       Orders:    Ambulatory Referral to General Surgery    Left inguinal hernia    Orders:    Diet NPO; Sips with meds; Standing    Apply Sequential Compression Device; Standing    Place sequential compression device; Standing    Case request operating room: REPAIR HERNIA INGUINAL, LAPAROSCOPIC; Standing    CBC and differential; Future    Basic metabolic panel; Future    EKG 12 lead; Future    Reason for no Mechanical VTE Prophylaxis; Standing    lactated ringers infusion    ceFAZolin (ANCEF) 2,000 mg in sodium chloride 0.9 % 50 mL IVPB    Stage 3a chronic kidney disease (HCC)  Lab Results   Component Value Date    EGFR 40 10/07/2024    EGFR 44 2024    EGFR 45 2023    CREATININE 1.75 (H) 10/07/2024    CREATININE 1.64 (H) 2024    CREATININE 1.60 (H) 2023                History of Present Illness   HPI  Rianna Vela is a 64 y.o. male who was referred to us by his primary physician and presents with left sided groin pain and bulging. Has had for several years now. Pain occurs intermittently. No nausea, vomiting, or  "constipation. He is able to push it back in. Notices pain and bulging while lifting heavily at work. Was placed on light duty, managed by primary. Would like to look into further repair of the left side. Held off previously due to not having insurance. Has insurance starting in January.     Previous repair of the right sided inguinal hernia (open approach) in the [place] in [place]. Patient states he believes no mesh was placed.     Currently is being followed by urology for elevated PSA and is on Flomax for benign prostatic hyperplasia. This is working well for him. No troubles urinating at this time.     Review of Systems   Constitutional:  Negative for fever.   Respiratory:  Negative for cough and shortness of breath.    Cardiovascular:  Negative for chest pain and palpitations.   Gastrointestinal:  Negative for abdominal pain, constipation, diarrhea, nausea and vomiting.   Genitourinary:  Negative for dysuria and hematuria.   Musculoskeletal:  Negative for arthralgias and back pain.   Skin:  Negative for color change and rash.   All other systems reviewed and are negative.         Objective   /84 (BP Location: Left arm, Patient Position: Sitting, Cuff Size: Adult)   Pulse 72   Temp 97.5 °F (36.4 °C) (Tympanic Core)   Ht 5' 6\" (1.676 m)   Wt 86.7 kg (191 lb 3.2 oz)   SpO2 98%   BMI 30.86 kg/m²      Physical Exam  Vitals and nursing note reviewed.   Constitutional:       General: He is not in acute distress.     Appearance: He is well-developed.   HENT:      Head: Normocephalic and atraumatic.   Eyes:      Conjunctiva/sclera: Conjunctivae normal.   Cardiovascular:      Rate and Rhythm: Normal rate and regular rhythm.      Heart sounds: No murmur heard.  Pulmonary:      Effort: Pulmonary effort is normal. No respiratory distress.      Breath sounds: Normal breath sounds.   Abdominal:      Palpations: Abdomen is soft.      Tenderness: There is no abdominal tenderness.      Hernia: A hernia is " present.      Comments: Reducible left inguinal hernia   Musculoskeletal:         General: No swelling.      Cervical back: Neck supple.   Skin:     General: Skin is warm and dry.      Capillary Refill: Capillary refill takes less than 2 seconds.   Neurological:      Mental Status: He is alert.   Psychiatric:         Mood and Affect: Mood normal.

## 2024-12-27 ENCOUNTER — HOSPITAL ENCOUNTER (OUTPATIENT)
Facility: MEDICAL CENTER | Age: 64
Discharge: HOME | End: 2024-12-27
Payer: COMMERCIAL

## 2024-12-27 DIAGNOSIS — R97.20 ELEVATED PSA: ICD-10-CM

## 2024-12-27 PROCEDURE — 76377 3D RENDER W/INTRP POSTPROCES: CPT

## 2024-12-27 PROCEDURE — A9585 GADOBUTROL INJECTION: HCPCS | Performed by: UROLOGY

## 2024-12-27 PROCEDURE — 72197 MRI PELVIS W/O & W/DYE: CPT

## 2024-12-27 RX ORDER — GADOBUTROL 604.72 MG/ML
8 INJECTION INTRAVENOUS
Status: COMPLETED | OUTPATIENT
Start: 2024-12-27 | End: 2024-12-27

## 2024-12-27 RX ADMIN — GADOBUTROL 8 ML: 604.72 INJECTION INTRAVENOUS at 14:14

## 2025-01-05 ENCOUNTER — RESULTS FOLLOW-UP (OUTPATIENT)
Dept: UROLOGY | Facility: CLINIC | Age: 65
End: 2025-01-05

## 2025-01-05 DIAGNOSIS — R30.0 BURNING WITH URINATION: Primary | ICD-10-CM

## 2025-01-05 DIAGNOSIS — R97.20 ELEVATED PSA: ICD-10-CM

## 2025-01-06 ENCOUNTER — TELEPHONE (OUTPATIENT)
Dept: SURGERY | Facility: CLINIC | Age: 65
End: 2025-01-06

## 2025-01-07 NOTE — TELEPHONE ENCOUNTER
Called patient using the  #[phone]. Left a generic vm, per communication consent. Informed the patient to call the office back at [phone].     Will try calling again later.

## 2025-01-09 NOTE — TELEPHONE ENCOUNTER
Called patient using  #[phone]. Informed the pt that his MRI is low risk. Informed him that it is recommended follow up with a PSA in 6 months and AP review.     Patient stated that he has been having burning with urination for the past couple weeks on and off. Patient stated that he would like to schedule an appointment ASAP to discuss. Patient stated that he has no fevers, chills, no gross hematuria, only burning with urination. Patient was also scheduled for an appointment per patient's request on 1/16/25 at 3:40 pm with RICHARD Vera. Informed the pt that orders were placed in his chart to rule out infection. Patient confirmed understanding and was thankful for the call.

## 2025-01-13 ENCOUNTER — APPOINTMENT (OUTPATIENT)
Dept: LAB | Facility: HOSPITAL | Age: 65
End: 2025-01-13
Payer: COMMERCIAL

## 2025-01-13 DIAGNOSIS — R30.0 BURNING WITH URINATION: ICD-10-CM

## 2025-01-13 LAB
BACTERIA UR QL AUTO: ABNORMAL /HPF
BILIRUB UR QL STRIP: NEGATIVE
CLARITY UR: CLEAR
COLOR UR: YELLOW
GLUCOSE UR STRIP-MCNC: NEGATIVE MG/DL
HGB UR QL STRIP.AUTO: NEGATIVE
HYALINE CASTS #/AREA URNS LPF: ABNORMAL /LPF
KETONES UR STRIP-MCNC: NEGATIVE MG/DL
LEUKOCYTE ESTERASE UR QL STRIP: 100
MUCOUS THREADS UR QL AUTO: ABNORMAL
NITRITE UR QL STRIP: NEGATIVE
NON-SQ EPI CELLS URNS QL MICRO: ABNORMAL /HPF
PH UR STRIP.AUTO: 5 [PH]
PROT UR STRIP-MCNC: ABNORMAL MG/DL
RBC #/AREA URNS AUTO: ABNORMAL /HPF
SP GR UR STRIP.AUTO: 1.01 (ref 1–1.04)
UROBILINOGEN UA: NEGATIVE MG/DL
WBC #/AREA URNS AUTO: ABNORMAL /HPF

## 2025-01-13 PROCEDURE — 81001 URINALYSIS AUTO W/SCOPE: CPT

## 2025-01-13 PROCEDURE — 87086 URINE CULTURE/COLONY COUNT: CPT

## 2025-01-14 LAB — BACTERIA UR CULT: NORMAL

## 2025-01-15 NOTE — PROGRESS NOTES
1/16/2025      No chief complaint on file.    Assessment and Plan    64 y.o. male managed by Dr. Gilmore       1. Painful urination  Assessment & Plan:    Dysuria  Urine culture 1/13- mixed contaminants  PVR- 32  No other positive cultures on file    No recent upper tract imaging on file   Plan to get updated renal US   Ct scan from 2021-Bilateral nonobstructing renal calculi, largest measuring 9 mm on the right side and 3 mm on the left side. No hydronephrosis or evidence of obstructive uropathy.   Reviewed adequate hydration and bladder irritants   Dysuria comes and goes, has been improving   Discussed comfort measures   Will call with imaging results   Follow up for annual   Orders:  -     US kidney and bladder; Future; Expected date: 01/16/2025    History of Present Illness  Rianna Vela is a 64 y.o. male here for evaluation of dysuria. Has been improving. Does come and go. Mainly drinks water during the day and tea. Hx of kidney stones. Last imaging was from 2021. Denies flank pain or gross hematuria. Was seen in November for annual visit. Continues on Flomax.Reports good urinary stream. Feels that he empties well. Has left inguinal hernia and is scheduled for surgery in march . Patient did have a multiparametric MRI completed due to an elevated PSA which showed then PI-RADS category 1.    Lab Results   Component Value Date    PSA 5.809 (H) 11/23/2024    PSA 4.620 (H) 10/07/2024    PSA 2.1 01/30/2021         Review of Systems   Constitutional:  Negative for chills and fever.   HENT:  Negative for ear pain and sore throat.    Eyes:  Negative for pain and visual disturbance.   Respiratory:  Negative for cough and shortness of breath.    Cardiovascular:  Negative for chest pain and palpitations.   Gastrointestinal:  Negative for abdominal pain and vomiting.   Genitourinary:  Positive for dysuria (intermittent). Negative for decreased urine volume, difficulty urinating, flank pain, frequency, hematuria and  "urgency.   Musculoskeletal:  Negative for arthralgias and back pain.   Skin:  Negative for color change and rash.   Neurological:  Negative for seizures and syncope.   All other systems reviewed and are negative.               Vitals  Vitals:    01/16/25 1501   BP: 120/80   BP Location: Left arm   Patient Position: Sitting   Cuff Size: Adult   Pulse: 102   SpO2: 98%   Weight: 86.6 kg (191 lb)   Height: 5' 6\" (1.676 m)       Physical Exam  Vitals reviewed.   Constitutional:       Appearance: Normal appearance.   HENT:      Head: Normocephalic and atraumatic.   Eyes:      Conjunctiva/sclera: Conjunctivae normal.   Pulmonary:      Effort: Pulmonary effort is normal.   Abdominal:      General: Abdomen is flat. There is no distension.      Palpations: Abdomen is soft.      Tenderness: There is no abdominal tenderness.      Hernia: A hernia is present. Hernia is present in the left inguinal area.   Genitourinary:     Penis: Normal and uncircumcised. No tenderness, discharge, swelling or lesions.       Testes: Normal.         Right: Tenderness or swelling not present.         Left: Tenderness or swelling not present.      Comments: No stricture noted   Musculoskeletal:         General: Normal range of motion.      Cervical back: Normal range of motion.   Skin:     General: Skin is warm and dry.   Neurological:      General: No focal deficit present.      Mental Status: He is alert and oriented to person, place, and time.   Psychiatric:         Mood and Affect: Mood normal.         Behavior: Behavior normal.         Thought Content: Thought content normal.         Judgment: Judgment normal.           Past History  Past Medical History:   Diagnosis Date   • Chronic kidney disease    • Diverticulosis    • Enlarged prostate    • Hypertension    • Kidney stone    • Multiple renal cysts    • Obesity    • Osteopenia    • Pre-diabetes      Social History     Socioeconomic History   • Marital status: /Civil Union     Spouse " name: None   • Number of children: None   • Years of education: None   • Highest education level: None   Occupational History   • None   Tobacco Use   • Smoking status: Never   • Smokeless tobacco: Never   Vaping Use   • Vaping status: Never Used   Substance and Sexual Activity   • Alcohol use: Not Currently   • Drug use: Not Currently   • Sexual activity: Not Currently     Partners: Female     Birth control/protection: None   Other Topics Concern   • None   Social History Narrative   • None     Social Drivers of Health     Financial Resource Strain: Not on file   Food Insecurity: Not on file   Transportation Needs: Not on file   Physical Activity: Not on file   Stress: Not on file   Social Connections: Not on file   Intimate Partner Violence: Not on file   Housing Stability: Not on file     Social History     Tobacco Use   Smoking Status Never   Smokeless Tobacco Never     Family History   Problem Relation Age of Onset   • Hypertension Mother    • Hypertension Father        The following portions of the patient's history were reviewed and updated as appropriate: allergies, current medications, past medical history, past social history, past surgical history and problem list.    Results  No results found for this or any previous visit (from the past hour).]  Lab Results   Component Value Date    PSA 5.809 (H) 11/23/2024    PSA 4.620 (H) 10/07/2024    PSA 2.1 01/30/2021     Lab Results   Component Value Date    CALCIUM 9.3 10/07/2024    K 5.1 10/07/2024    CO2 25 10/07/2024     10/07/2024    BUN 29 (H) 10/07/2024    CREATININE 1.75 (H) 10/07/2024     Lab Results   Component Value Date    WBC 6.27 09/21/2024    HGB 14.5 09/21/2024    HCT 44.0 09/21/2024    MCV 93 09/21/2024     09/21/2024

## 2025-01-16 ENCOUNTER — OFFICE VISIT (OUTPATIENT)
Dept: UROLOGY | Facility: CLINIC | Age: 65
End: 2025-01-16

## 2025-01-16 VITALS
DIASTOLIC BLOOD PRESSURE: 80 MMHG | HEART RATE: 102 BPM | OXYGEN SATURATION: 98 % | WEIGHT: 191 LBS | BODY MASS INDEX: 30.7 KG/M2 | SYSTOLIC BLOOD PRESSURE: 120 MMHG | HEIGHT: 66 IN

## 2025-01-16 DIAGNOSIS — R30.9 PAINFUL URINATION: Primary | ICD-10-CM

## 2025-01-16 NOTE — ASSESSMENT & PLAN NOTE
Dysuria  Urine culture 1/13- mixed contaminants  PVR- 32  No other positive cultures on file    No recent upper tract imaging on file   Plan to get updated renal US   Ct scan from 2021-Bilateral nonobstructing renal calculi, largest measuring 9 mm on the right side and 3 mm on the left side. No hydronephrosis or evidence of obstructive uropathy.   Reviewed adequate hydration and bladder irritants   Dysuria comes and goes, has been improving   Discussed comfort measures   Will call with imaging results   Follow up for annual

## 2025-01-20 ENCOUNTER — HOSPITAL ENCOUNTER (OUTPATIENT)
Dept: ULTRASOUND IMAGING | Facility: HOSPITAL | Age: 65
Discharge: HOME | End: 2025-01-20
Payer: COMMERCIAL

## 2025-01-20 DIAGNOSIS — R30.9 PAINFUL URINATION: ICD-10-CM

## 2025-01-20 PROCEDURE — 76775 US EXAM ABDO BACK WALL LIM: CPT

## 2025-01-23 ENCOUNTER — RESULTS FOLLOW-UP (OUTPATIENT)
Dept: UROLOGY | Facility: CLINIC | Age: 65
End: 2025-01-23

## 2025-02-27 ENCOUNTER — ANESTHESIA EVENT (OUTPATIENT)
Dept: PERIOP | Facility: HOSPITAL | Age: 65
End: 2025-02-27
Payer: COMMERCIAL

## 2025-02-27 NOTE — PRE-PROCEDURE INSTRUCTIONS
Pre-Surgery Instructions:   Medication Instructions    atorvastatin (LIPITOR) 40 mg tablet Take day of surgery.    linaCLOtide 145 MCG CAPS Hold day of surgery.    lisinopril (ZESTRIL) 20 mg tablet Hold day of surgery.    tamsulosin (FLOMAX) 0.4 mg Take day of surgery.   Medication instructions for day of surgery reviewed. Please take all instructed medications with only a sip of water.       You will receive a call one business day prior to surgery with an arrival time and hospital directions. If your surgery is scheduled on a Monday, the hospital will be calling you on the Friday prior to your surgery. If you have not heard from anyone by 8pm, please call the hospital supervisor through the hospital  at [phone]. ([place] [phone] or [place] [phone]).    Do not eat or drink anything after midnight the night before your surgery, including candy, mints, lifesavers, or chewing gum. Do not drink alcohol 24hrs before your surgery. Try not to smoke at least 24hrs before your surgery.       Follow the pre surgery showering instructions as listed in the “My Surgical Experience Booklet” or otherwise provided by your surgeon's office. Do not use a blade to shave the surgical area 1 week before surgery. It is okay to use a clean electric clippers up to 24 hours before surgery. Do not apply any lotions, creams, including makeup, cologne, deodorant, or perfumes after showering on the day of your surgery. Do not use dry shampoo, hair spray, hair gel, or any type of hair products.     No contact lenses, eye make-up, or artificial eyelashes. Remove nail polish, including gel polish, and any artificial, gel, or acrylic nails if possible. Remove all jewelry including rings and body piercing jewelry.     Wear causal clothing that is easy to take on and off. Consider your type of surgery.    Keep any valuables, jewelry, piercings at home. Please bring any specially ordered equipment (sling, braces) if  indicated.    Arrange for a responsible person to drive you to and from the hospital on the day of your surgery. Please confirm the visitor policy for the day of your procedure when you receive your phone call with an arrival time.     Call the surgeon's office with any new illnesses, exposures, or additional questions prior to surgery.    Please reference your “My Surgical Experience Booklet” for additional information to prepare for your upcoming surgery.

## 2025-02-28 ENCOUNTER — NURSE TRIAGE (OUTPATIENT)
Age: 65
End: 2025-02-28

## 2025-02-28 NOTE — TELEPHONE ENCOUNTER
"Answer Assessment - Initial Assessment Questions  1. REASON FOR CALL: \"What is the main reason for your call?\" or \"How can I best help you?\"         Patient called in stating he has surgery on Monday and did not receive a call yet for arrival time. Informed him that he will receive a call sometime this afternoon. No further assistance needed.    Protocols used: Information Only Call - No Triage-Adult-[place]    "

## 2025-03-01 ENCOUNTER — APPOINTMENT (OUTPATIENT)
Dept: LAB | Facility: HOSPITAL | Age: 65
End: 2025-03-01
Payer: COMMERCIAL

## 2025-03-01 ENCOUNTER — OFFICE VISIT (OUTPATIENT)
Dept: LAB | Facility: HOSPITAL | Age: 65
End: 2025-03-01
Payer: COMMERCIAL

## 2025-03-01 DIAGNOSIS — K40.90 LEFT INGUINAL HERNIA: ICD-10-CM

## 2025-03-01 DIAGNOSIS — R97.20 ELEVATED PSA: ICD-10-CM

## 2025-03-01 LAB
ANION GAP SERPL CALCULATED.3IONS-SCNC: 7 MMOL/L (ref 4–13)
BASOPHILS # BLD AUTO: 0.03 THOUSANDS/ÂΜL (ref 0–0.1)
BASOPHILS NFR BLD AUTO: 0 % (ref 0–1)
BUN SERPL-MCNC: 26 MG/DL (ref 5–25)
CALCIUM SERPL-MCNC: 9.2 MG/DL (ref 8.4–10.2)
CHLORIDE SERPL-SCNC: 106 MMOL/L (ref 96–108)
CO2 SERPL-SCNC: 26 MMOL/L (ref 21–32)
CREAT SERPL-MCNC: 1.56 MG/DL (ref 0.6–1.3)
EOSINOPHIL # BLD AUTO: 0.36 THOUSAND/ÂΜL (ref 0–0.61)
EOSINOPHIL NFR BLD AUTO: 5 % (ref 0–6)
ERYTHROCYTE [DISTWIDTH] IN BLOOD BY AUTOMATED COUNT: 13.2 % (ref 11.6–15.1)
GFR SERPL CREATININE-BSD FRML MDRD: 46 ML/MIN/1.73SQ M
GLUCOSE P FAST SERPL-MCNC: 99 MG/DL (ref 65–99)
HCT VFR BLD AUTO: 43.7 % (ref 36.5–49.3)
HGB BLD-MCNC: 13.7 G/DL (ref 12–17)
IMM GRANULOCYTES # BLD AUTO: 0.02 THOUSAND/UL (ref 0–0.2)
IMM GRANULOCYTES NFR BLD AUTO: 0 % (ref 0–2)
LYMPHOCYTES # BLD AUTO: 2.67 THOUSANDS/ÂΜL (ref 0.6–4.47)
LYMPHOCYTES NFR BLD AUTO: 37 % (ref 14–44)
MCH RBC QN AUTO: 29.8 PG (ref 26.8–34.3)
MCHC RBC AUTO-ENTMCNC: 31.4 G/DL (ref 31.4–37.4)
MCV RBC AUTO: 95 FL (ref 82–98)
MONOCYTES # BLD AUTO: 0.84 THOUSAND/ÂΜL (ref 0.17–1.22)
MONOCYTES NFR BLD AUTO: 12 % (ref 4–12)
NEUTROPHILS # BLD AUTO: 3.37 THOUSANDS/ÂΜL (ref 1.85–7.62)
NEUTS SEG NFR BLD AUTO: 46 % (ref 43–75)
NRBC BLD AUTO-RTO: 0 /100 WBCS
PLATELET # BLD AUTO: 181 THOUSANDS/UL (ref 149–390)
PMV BLD AUTO: 11 FL (ref 8.9–12.7)
POTASSIUM SERPL-SCNC: 4.6 MMOL/L (ref 3.5–5.3)
RBC # BLD AUTO: 4.59 MILLION/UL (ref 3.88–5.62)
SODIUM SERPL-SCNC: 139 MMOL/L (ref 135–147)
WBC # BLD AUTO: 7.29 THOUSAND/UL (ref 4.31–10.16)

## 2025-03-01 PROCEDURE — 36415 COLL VENOUS BLD VENIPUNCTURE: CPT

## 2025-03-01 PROCEDURE — 85025 COMPLETE CBC W/AUTO DIFF WBC: CPT

## 2025-03-01 PROCEDURE — 93005 ELECTROCARDIOGRAM TRACING: CPT

## 2025-03-01 PROCEDURE — 80048 BASIC METABOLIC PNL TOTAL CA: CPT

## 2025-03-03 ENCOUNTER — HOSPITAL ENCOUNTER (OUTPATIENT)
Facility: HOSPITAL | Age: 65
Setting detail: OUTPATIENT SURGERY
Discharge: HOME | End: 2025-03-03
Attending: SURGERY | Admitting: SURGERY
Payer: COMMERCIAL

## 2025-03-03 ENCOUNTER — ANESTHESIA (OUTPATIENT)
Dept: PERIOP | Facility: HOSPITAL | Age: 65
End: 2025-03-03
Payer: COMMERCIAL

## 2025-03-03 VITALS
DIASTOLIC BLOOD PRESSURE: 65 MMHG | RESPIRATION RATE: 16 BRPM | HEIGHT: 66 IN | BODY MASS INDEX: 30.29 KG/M2 | SYSTOLIC BLOOD PRESSURE: 109 MMHG | TEMPERATURE: 97 F | WEIGHT: 188.49 LBS | HEART RATE: 90 BPM | OXYGEN SATURATION: 98 %

## 2025-03-03 DIAGNOSIS — K40.90 LEFT INGUINAL HERNIA: ICD-10-CM

## 2025-03-03 LAB
ATRIAL RATE: 67 BPM
P AXIS: 52 DEGREES
PR INTERVAL: 158 MS
QRS AXIS: 3 DEGREES
QRSD INTERVAL: 106 MS
QT INTERVAL: 394 MS
QTC INTERVAL: 416 MS
T WAVE AXIS: 36 DEGREES
VENTRICULAR RATE: 67 BPM

## 2025-03-03 PROCEDURE — 49650 LAP ING HERNIA REPAIR INIT: CPT | Performed by: SURGERY

## 2025-03-03 PROCEDURE — NC001 PR NO CHARGE: Performed by: SURGERY

## 2025-03-03 PROCEDURE — C1781 MESH (IMPLANTABLE): HCPCS | Performed by: SURGERY

## 2025-03-03 PROCEDURE — 93010 ELECTROCARDIOGRAM REPORT: CPT | Performed by: STUDENT IN AN ORGANIZED HEALTH CARE EDUCATION/TRAINING PROGRAM

## 2025-03-03 PROCEDURE — C1727 CATH, BAL TIS DIS, NON-VAS: HCPCS | Performed by: SURGERY

## 2025-03-03 PROCEDURE — 93005 ELECTROCARDIOGRAM TRACING: CPT

## 2025-03-03 PROCEDURE — 51798 US URINE CAPACITY MEASURE: CPT | Performed by: SURGERY

## 2025-03-03 DEVICE — 3DMAX MID ANATOMICAL MESH, 10 CM X 16 CM (4" X 6"), LARGE, LEFT
Type: IMPLANTABLE DEVICE | Site: INGUINAL | Status: FUNCTIONAL
Brand: 3DMAX

## 2025-03-03 DEVICE — ETHICON SECURESTRAP ABSORBABLE FIXATION DEVICE
Type: IMPLANTABLE DEVICE | Site: INGUINAL | Status: FUNCTIONAL
Brand: ETHICON SECURESTRAP

## 2025-03-03 RX ORDER — GLYCOPYRROLATE 0.2 MG/ML
INJECTION INTRAMUSCULAR; INTRAVENOUS AS NEEDED
Status: DISCONTINUED | OUTPATIENT
Start: 2025-03-03 | End: 2025-03-03

## 2025-03-03 RX ORDER — VASOPRESSIN 20 [USP'U]/ML
INJECTION, SOLUTION INTRAVENOUS AS NEEDED
Status: DISCONTINUED | OUTPATIENT
Start: 2025-03-03 | End: 2025-03-03

## 2025-03-03 RX ORDER — ROCURONIUM BROMIDE 10 MG/ML
INJECTION, SOLUTION INTRAVENOUS AS NEEDED
Status: DISCONTINUED | OUTPATIENT
Start: 2025-03-03 | End: 2025-03-03

## 2025-03-03 RX ORDER — FENTANYL CITRATE 50 UG/ML
INJECTION, SOLUTION INTRAMUSCULAR; INTRAVENOUS AS NEEDED
Status: DISCONTINUED | OUTPATIENT
Start: 2025-03-03 | End: 2025-03-03

## 2025-03-03 RX ORDER — PROPOFOL 10 MG/ML
INJECTION, EMULSION INTRAVENOUS AS NEEDED
Status: DISCONTINUED | OUTPATIENT
Start: 2025-03-03 | End: 2025-03-03

## 2025-03-03 RX ORDER — ONDANSETRON 2 MG/ML
INJECTION INTRAMUSCULAR; INTRAVENOUS AS NEEDED
Status: DISCONTINUED | OUTPATIENT
Start: 2025-03-03 | End: 2025-03-03

## 2025-03-03 RX ORDER — OXYCODONE HYDROCHLORIDE 5 MG/1
5 TABLET ORAL EVERY 6 HOURS PRN
Qty: 12 TABLET | Refills: 0 | Status: SHIPPED | OUTPATIENT
Start: 2025-03-03 | End: 2025-03-13

## 2025-03-03 RX ORDER — SODIUM CHLORIDE, SODIUM LACTATE, POTASSIUM CHLORIDE, CALCIUM CHLORIDE 600; 310; 30; 20 MG/100ML; MG/100ML; MG/100ML; MG/100ML
125 INJECTION, SOLUTION INTRAVENOUS CONTINUOUS
Status: DISCONTINUED | OUTPATIENT
Start: 2025-03-03 | End: 2025-03-03 | Stop reason: HOSPADM

## 2025-03-03 RX ORDER — CEFAZOLIN SODIUM 2 G/50ML
2000 SOLUTION INTRAVENOUS ONCE
Status: COMPLETED | OUTPATIENT
Start: 2025-03-03 | End: 2025-03-03

## 2025-03-03 RX ORDER — OXYCODONE HYDROCHLORIDE 5 MG/1
5 TABLET ORAL EVERY 6 HOURS PRN
Status: DISCONTINUED | OUTPATIENT
Start: 2025-03-03 | End: 2025-03-03 | Stop reason: HOSPADM

## 2025-03-03 RX ORDER — BUPIVACAINE HYDROCHLORIDE AND EPINEPHRINE 2.5; 5 MG/ML; UG/ML
INJECTION, SOLUTION EPIDURAL; INFILTRATION; INTRACAUDAL; PERINEURAL AS NEEDED
Status: DISCONTINUED | OUTPATIENT
Start: 2025-03-03 | End: 2025-03-03 | Stop reason: HOSPADM

## 2025-03-03 RX ORDER — MAGNESIUM HYDROXIDE 1200 MG/15ML
LIQUID ORAL AS NEEDED
Status: DISCONTINUED | OUTPATIENT
Start: 2025-03-03 | End: 2025-03-03 | Stop reason: HOSPADM

## 2025-03-03 RX ORDER — MEPERIDINE HYDROCHLORIDE 25 MG/ML
12.5 INJECTION INTRAMUSCULAR; INTRAVENOUS; SUBCUTANEOUS
Status: DISCONTINUED | OUTPATIENT
Start: 2025-03-03 | End: 2025-03-03 | Stop reason: HOSPADM

## 2025-03-03 RX ORDER — HYDROMORPHONE HCL/PF 1 MG/ML
0.5 SYRINGE (ML) INJECTION
Status: DISCONTINUED | OUTPATIENT
Start: 2025-03-03 | End: 2025-03-03 | Stop reason: HOSPADM

## 2025-03-03 RX ORDER — HYDROMORPHONE HYDROCHLORIDE 2 MG/ML
INJECTION, SOLUTION INTRAMUSCULAR; INTRAVENOUS; SUBCUTANEOUS AS NEEDED
Status: DISCONTINUED | OUTPATIENT
Start: 2025-03-03 | End: 2025-03-03

## 2025-03-03 RX ORDER — FENTANYL CITRATE/PF 50 MCG/ML
25 SYRINGE (ML) INJECTION
Status: DISCONTINUED | OUTPATIENT
Start: 2025-03-03 | End: 2025-03-03 | Stop reason: HOSPADM

## 2025-03-03 RX ORDER — EPHEDRINE SULFATE 50 MG/ML
INJECTION INTRAVENOUS AS NEEDED
Status: DISCONTINUED | OUTPATIENT
Start: 2025-03-03 | End: 2025-03-03

## 2025-03-03 RX ORDER — ONDANSETRON 2 MG/ML
4 INJECTION INTRAMUSCULAR; INTRAVENOUS ONCE AS NEEDED
Status: DISCONTINUED | OUTPATIENT
Start: 2025-03-03 | End: 2025-03-03 | Stop reason: HOSPADM

## 2025-03-03 RX ORDER — MIDAZOLAM HYDROCHLORIDE 2 MG/2ML
INJECTION, SOLUTION INTRAMUSCULAR; INTRAVENOUS AS NEEDED
Status: DISCONTINUED | OUTPATIENT
Start: 2025-03-03 | End: 2025-03-03

## 2025-03-03 RX ORDER — LIDOCAINE HYDROCHLORIDE 20 MG/ML
INJECTION, SOLUTION EPIDURAL; INFILTRATION; INTRACAUDAL; PERINEURAL AS NEEDED
Status: DISCONTINUED | OUTPATIENT
Start: 2025-03-03 | End: 2025-03-03

## 2025-03-03 RX ADMIN — FENTANYL CITRATE 50 MCG: 50 INJECTION INTRAMUSCULAR; INTRAVENOUS at 07:43

## 2025-03-03 RX ADMIN — HYDROMORPHONE HYDROCHLORIDE 0.5 MG: 2 INJECTION INTRAMUSCULAR; INTRAVENOUS; SUBCUTANEOUS at 09:24

## 2025-03-03 RX ADMIN — SODIUM CHLORIDE, SODIUM LACTATE, POTASSIUM CHLORIDE, AND CALCIUM CHLORIDE: .6; .31; .03; .02 INJECTION, SOLUTION INTRAVENOUS at 08:40

## 2025-03-03 RX ADMIN — ONDANSETRON 4 MG: 2 INJECTION INTRAMUSCULAR; INTRAVENOUS at 09:15

## 2025-03-03 RX ADMIN — VASOPRESSIN 1 UNITS: 20 INJECTION INTRAVENOUS at 08:51

## 2025-03-03 RX ADMIN — SUGAMMADEX 200 MG: 100 INJECTION, SOLUTION INTRAVENOUS at 09:18

## 2025-03-03 RX ADMIN — ROCURONIUM 50 MG: 50 INJECTION, SOLUTION INTRAVENOUS at 07:43

## 2025-03-03 RX ADMIN — CEFAZOLIN SODIUM 2000 MG: 2 SOLUTION INTRAVENOUS at 07:47

## 2025-03-03 RX ADMIN — ROCURONIUM 20 MG: 50 INJECTION, SOLUTION INTRAVENOUS at 09:01

## 2025-03-03 RX ADMIN — VASOPRESSIN 1 UNITS: 20 INJECTION INTRAVENOUS at 08:58

## 2025-03-03 RX ADMIN — SODIUM CHLORIDE, SODIUM LACTATE, POTASSIUM CHLORIDE, AND CALCIUM CHLORIDE 125 ML/HR: .6; .31; .03; .02 INJECTION, SOLUTION INTRAVENOUS at 06:35

## 2025-03-03 RX ADMIN — EPHEDRINE SULFATE 10 MG: 50 INJECTION INTRAVENOUS at 08:56

## 2025-03-03 RX ADMIN — FENTANYL CITRATE 50 MCG: 50 INJECTION INTRAMUSCULAR; INTRAVENOUS at 08:08

## 2025-03-03 RX ADMIN — ROCURONIUM 10 MG: 50 INJECTION, SOLUTION INTRAVENOUS at 08:56

## 2025-03-03 RX ADMIN — ROCURONIUM 20 MG: 50 INJECTION, SOLUTION INTRAVENOUS at 08:29

## 2025-03-03 RX ADMIN — EPHEDRINE SULFATE 10 MG: 50 INJECTION INTRAVENOUS at 08:19

## 2025-03-03 RX ADMIN — EPHEDRINE SULFATE 10 MG: 50 INJECTION INTRAVENOUS at 07:58

## 2025-03-03 RX ADMIN — SODIUM CHLORIDE, SODIUM LACTATE, POTASSIUM CHLORIDE, AND CALCIUM CHLORIDE: .6; .31; .03; .02 INJECTION, SOLUTION INTRAVENOUS at 07:32

## 2025-03-03 RX ADMIN — GLYCOPYRROLATE 0.2 MCG: 0.2 INJECTION, SOLUTION INTRAMUSCULAR; INTRAVENOUS at 08:13

## 2025-03-03 RX ADMIN — MIDAZOLAM 2 MG: 1 INJECTION INTRAMUSCULAR; INTRAVENOUS at 07:34

## 2025-03-03 RX ADMIN — PROPOFOL 150 MG: 10 INJECTION, EMULSION INTRAVENOUS at 07:43

## 2025-03-03 RX ADMIN — LIDOCAINE HYDROCHLORIDE 80 MG: 20 INJECTION, SOLUTION EPIDURAL; INFILTRATION; INTRACAUDAL at 07:43

## 2025-03-03 NOTE — ANESTHESIA POSTPROCEDURE EVALUATION
Post-Op Assessment Note    CV Status:  Stable  Pain Score: 0    Pain management: adequate       Mental Status:  Alert and awake   Hydration Status:  Euvolemic   PONV Controlled:  Controlled   Airway Patency:  Patent     Post Op Vitals Reviewed: Yes    No anethesia notable event occurred.            Last Filed PACU Vitals:  Vitals Value Taken Time   Temp     Pulse 86 03/03/25 0937   /63 03/03/25 0936   Resp     SpO2     Vitals shown include unfiled device data.

## 2025-03-03 NOTE — DISCHARGE INSTR - AVS FIRST PAGE
[facility]’s General Surgery [facility]     Post-Operative Care Instructions     Dr. Naldo Lott MD, [facility]     [phone]          1. General: You will feel pulling sensations around the wound or funny aches and pains around the incisions. This is normal. Even minor surgery is a change in your body and this is your body’s way of reacting to it. If you have had abdominal surgery, it may help to support the incision with a small pillow or blanket for comfort when moving or coughing.     2. Wound care:  Okay to shower.  The glue will fall off over the next week or 2.   Use ice for at least the 1st 48 hours.  Do not use for longer than 20 minutes at a time. Use 3 times per day.     3. Water: You may shower over the wounds. Do not bathe or use a pool or hot tub until cleared by the physician.   If you were discharged with a drain, make sure drain site is covered with plastic wrap before showering.      4. Activity: You may go up and down stairs, walk as much as you are comfortable, but walk at least 3 times each day. If you have had abdominal surgery, do not lift anything heavier than 20 pounds for at least 4 weeks.      5. Diet: You may resume a regular diet. If you had a same-day surgery or overnight stay surgery, you may wish to eat lightly for a few days: soups, crackers, and sandwiches. You may resume a regular diet when ready.      6. Medications: Resume all of your previous medications, unless told otherwise by the doctor. Avoid aspirin products for 2-3 days after the date of surgery. You may, at that time, began to take them again. Use Tylenol and Ibuprofen for pain control.  You may alternate these medications every 3 hours.  For example: you may take Tylenol at noon, Ibuprofen at 3:00 p.m., and Tylenol again at 6:00 p.m., etc. You should use ice to assist with pain control as above.  You do not need to take narcotic pain medication unless you are having significant pain.   If you were prescribed a  narcotic pain medication containing Tylenol, such as Percocet or Norco, do not use supplemental Tylenol.      7. Driving: You will need someone to drive you home on the day of surgery or discharge. Do not drive or make any important decisions while on narcotic pain medication or 24 hours and after anesthesia or sedation for surgery. Generally, you may drive when your off all narcotic pain medications and you are comfortable.      8. Upset Stomach: You may take Maalox, Tums, or similar items for an upset stomach. If your narcotic pain medication causes an upset stomach, do not take it on an empty stomach. Try taking it with at least some crackers or toast.      9. Constipation: Patients often experience constipation after surgery. You may take over-the-counter medication for this, such as Metamucil, Senokot, Dulcolax, milk of magnesia, etc. You may take a suppository unless you have had anorectal surgery such as a procedure on your hemorrhoids. If you experience significant nausea or vomiting after abdominal surgery, call the office before trying any of these medications.     10. Call the office: If you are experiencing any of the following: fevers above 101.5°, significant nausea or vomiting, if the wound develops drainage and/or there is excessive redness around the wound, or if you have significant diarrhea or other worsening symptoms.     11. Pain: You may be given a prescription for pain medication.  This will be sent to your pharmacy prior to discharge.     12. Sexual Activity: You may resume sexual activity when you feel ready and comfortable and your incision is sealed and healed without apparent infection risk.     13. Urination: If you have not urinated in 6 hours, go directly to the ER for evaluation for urinary retention.      14. Follow-up in 2 weeks.          **READ ONLY IF YOU HAVE BEEN DISCHARGED WITH A URINARY CATHETER**    Julian Insertion for Post-Op Urinary Retention        - A prescription for  Flomax will be sent to your pharmacy.  This should be taken daily while the urinary catheter remains in place.    You will not be given a prescription for Flomax if your prostate has been removed.  If you are already taking Flomax, continue the medication as prescribed.     - We will send a message to the urology group who will contact you within the next 48 hours with further instructions and to schedule an appointment for voiding trial and catheter removal.  The urinary catheter will remain in place for approximately 1 week.  If you are not contacted within the next 48 hours please call our office to assist with scheduling your follow-up.     - If you have your own urologist, you should contact your physician the day after discharge for instructions and to schedule a voiding trial and catheter removal.

## 2025-03-03 NOTE — H&P
History & Physical    Rianna Vela    64 y.o.  male  [id number]  Surgeon:Naldo Lott MD  Date: March 3, 2025    Assessment:  Patient Active Problem List   Diagnosis    Essential hypertension    Mixed hyperlipidemia    Non-recurrent unilateral inguinal hernia without obstruction or gangrene    Stage 3a chronic kidney disease (HCC)    Erectile dysfunction    Kidney stones    Seasonal allergic rhinitis due to pollen    Pre-diabetes    Osteopenia of multiple sites    Elevated PSA    Painful urination     Plan:  Rianna Vela is scheduled for laparoscopic left inguinal hernia repair with mesh.    HPI    Historical Information   Past Medical History:   Diagnosis Date    Chronic kidney disease     Diverticulosis     ED (erectile dysfunction)     Enlarged prostate     Hypertension     Kidney stone     Multiple renal cysts     Obesity     Osteopenia     Pre-diabetes      Past Surgical History:   Procedure Laterality Date    COLONOSCOPY  04/2022    diverticulosis    HERNIA REPAIR Right 1993    inguinal R side repair in      Social History   Social History     Substance and Sexual Activity   Alcohol Use Not Currently     Social History     Substance and Sexual Activity   Drug Use Not Currently     Social History     Tobacco Use   Smoking Status Never   Smokeless Tobacco Never     Family History   Problem Relation Age of Onset    Hypertension Mother     Hypertension Father         Meds/Allergies   No Known Allergies    Current Facility-Administered Medications:     lactated ringers infusion, 125 mL/hr, Intravenous, Continuous, Pipe Ramey DO, New Bag at 03/03/25 0840    lactated ringers infusion, 125 mL/hr, Intravenous, Continuous, RICHARD Harris, Last Rate: 125 mL/hr at 03/03/25 0635, 125 mL/hr at 03/03/25 0635    sodium chloride 0.9 % irrigation solution, , , PRN, Naldo Lott MD, 1,000 mL at 03/03/25 0747    Facility-Administered Medications Ordered in Other Encounters:     ePHEDrine injection, ,  Intravenous, PRN, Shreyas Love, CRNA, 10 mg at 03/03/25 0856    fentaNYL injection, , Intravenous, PRN, Shreyas Nassaro, CRNA, 50 mcg at 03/03/25 0808    glycopyrrolate (ROBINUL) injection, , Intravenous, PRN, Shreyas Ivan, CRNA, 0.2 mcg at 03/03/25 0813    lidocaine (PF) (XYLOCAINE-MPF) 2 % injection, , Intravenous, PRN, Shreyas Nassaro, CRNA, 80 mg at 03/03/25 0743    midazolam (VERSED) injection, , Intravenous, PRN, Shreyas Ivan, CRNA, 2 mg at 03/03/25 0734    ondansetron (ZOFRAN) injection, , Intravenous, PRN, Shreyas Nassaro, CRNA, 4 mg at 03/03/25 0915    phenylephrine bolus from bag, , Intravenous, PRN, Shreyas Love, CRNA, 100 mcg at 03/03/25 0901    propofol (DIPRIVAN) 200 MG/20ML bolus injection, , Intravenous, PRN, Shreyas Love, CRNA, 150 mg at 03/03/25 0743    ROCuronium (ZEMURON) injection, , Intravenous, PRN, Shreyas Love, CRNA, 20 mg at 03/03/25 0901    Sugammadex Sodium (BRIDION), , Intravenous, PRN, Shreyas Love, CRNA, 200 mg at 03/03/25 0918    vasopressin (PITRESSIN) injection, , Intravenous, PRN, Shreyas Love CRNA, 1 Units at 03/03/25 0858    Review of Systems    Vitals:    03/03/25 0608   BP: 126/74   Pulse: 72   Resp: 16   Temp: (!) 97.1 °F (36.2 °C)   SpO2: 100%     Physical Exam  GEN: NAD, A+OX3   HEENT: Normocephalic, atraumatic,   NECK: Supple, trachea midline,   CARDIAC: regular rate & rhythm, S1 & S2 normal.   LUNGS: Clear to auscultation, No Wheeze, Rales, or Rhonchi  ABDOMEN: Left inguinal hernia  EXTREMITIES: No evidence of cyanosis, clubbing or edema. Pulses +2 B/L LE  NEURO: CN II-XII intact grossly, No sensory or motor deficits    Lab Results:   Imaging:   EKG, Pathology, and Other Studies:   Lab Results   Component Value Date    CALCIUM 9.2 03/01/2025    K 4.6 03/01/2025    CO2 26 03/01/2025     03/01/2025    BUN 26 (H) 03/01/2025    CREATININE 1.56 (H) 03/01/2025     Lab Results   Component Value Date    WBC 7.29 03/01/2025    HGB 13.7 03/01/2025    HCT 43.7  03/01/2025    MCV 95 03/01/2025     03/01/2025     Lab Results   Component Value Date    ALT 13 09/21/2024    AST 16 09/21/2024    ALKPHOS 73 09/21/2024

## 2025-03-03 NOTE — OP NOTE
OPERATIVE REPORT  PATIENT NAME: Rianna Vela    :  1960  MRN: [id number]  Pt Location: [facility] OR ROOM 03    SURGERY DATE: 3/3/2025    Surgeons and Role:     * Naldo Lott MD - Primary   ZAY Russell PGY V  Assist  Preop Diagnosis:  Left inguinal hernia [K40.90]    Post-Op Diagnosis Codes:     * Left inguinal hernia [K40.90]    Procedure(s):  Left - REPAIR HERNIA INGUINAL. LAP    Specimen(s):  * No specimens in log *    Estimated Blood Loss:   Minimal    Drains:  * No LDAs found *    Anesthesia Type:   General    Operative Indications:  Left inguinal hernia [K40.90]      Operative Findings:  Left direct inguinal hernia      Complications:   None    Procedure and Technique:      The patient was seen again in the Holding Room. The risks, benefits, complications, treatment options, and expected outcomes were discussed with the patient. The patient and/or family concurred with the proposed plan, giving informed consent. The site of surgery properly noted/marked. The patient was taken to Operating Room, identified by verbal and visual wrist identification and the procedure verified as Laparoscopic left  inguinal hernia repair with mesh.  The above information was confirmed.  Prior to the induction of general anesthesia, antibiotic prophylaxis was administered. General endotracheal anesthesia was then administered and tolerated well. After the induction, the abdomen was prepped in the usual sterile fashion. The patient was positioned in the supine position. A Time Out was held after prepping and draping in sterile fashion.  An incision was made in the infraumbilical fold after infusion of 0.25% marcaine with epinephrine with an #11 blade scalpel. Subcutaneous tissues were dissected with bovie cautery down to the anterior fascia. The anterior fascia was opened revealing the rectus muscle. This was retracted laterally and a ballon tipped dissector was passed in the preperitoneal space down to the pubic bone. The  balloon was inflated under direct visualization. The a balloon tipped trocar was placed in the space and the gas was turned on. Two 5mm trocars were placed in the midline below the umbilical port.  Attention was turned to the midline.  The avascular plane was dissected down to the midline until the pubic bone was visualized.  There was evidence of a direct hernia that was dissected out of the hernia defect until reduction was complete. Attention was turned out laterally creating the pocket and avascular plane between the peritoneum and the sidewall. Attention was turned to the indirect space and the peritoneum was dissected off the cord structures carefully peeling this layers apart. Eventually the peritoneum was dissected down well below where the vas deferens turned medially. Now the dissection was complete.  The patient had evidence of a direct  inguinal hernia. A Bard 3D max mesh was rolled up and passed through the trocar and deployed in appropriate position. The peritoneal edge was well below the bottom portion of the mesh.  The mesh secured with secure strap at Tejas's.  There is excellent hemostasis. The gas was then turned off and the air was evacuated watching to ensure that the peritoneum held the mesh in appropriate position.  The trochars were removed. The umbilical fascia was closed with 2 figure-of-eight 0 Vicryl sutures. Skin was closed with interrupted 4-0 Monocryl sutures. Histocry was applied.    This text is generated with voice recognition software. There may be translation, syntax,  or grammatical errors. If you have any questions, please contact the dictating provider.         I was present for the entire procedure.    Patient Disposition:  PACU              SIGNATURE: Naldo Lott MD  DATE: March 3, 2025  TIME: 9:20 AM

## 2025-03-03 NOTE — ANESTHESIA POSTPROCEDURE EVALUATION
Post-Op Assessment Note    CV Status:  Stable  Pain Score: 0    Pain management: adequate       Mental Status:  Alert and awake   Hydration Status:  Euvolemic   PONV Controlled:  Controlled   Airway Patency:  Patent     Post Op Vitals Reviewed: Yes    No anethesia notable event occurred.         Post-Op Assessment Note    Last Filed PACU Vitals:  Vitals Value Taken Time   Temp 97.6 °F (36.4 °C) 03/03/25 1005   Pulse 84 03/03/25 1008   BP 99/57 03/03/25 1005   Resp 15 03/03/25 1005   SpO2 91 % 03/03/25 1008   Vitals shown include unfiled device data.    Modified Maia:     Vitals Value Taken Time   Activity 2 03/03/25 1005   Respiration 2 03/03/25 1005   Circulation 2 03/03/25 1005   Consciousness 1 03/03/25 1005   Oxygen Saturation 2 03/03/25 1005     Modified Maia Score: 9

## 2025-03-04 LAB
ATRIAL RATE: 67 BPM
P AXIS: 41 DEGREES
PR INTERVAL: 144 MS
QRS AXIS: -11 DEGREES
QRSD INTERVAL: 106 MS
QT INTERVAL: 422 MS
QTC INTERVAL: 446 MS
T WAVE AXIS: 26 DEGREES
VENTRICULAR RATE: 67 BPM

## 2025-03-04 PROCEDURE — 93010 ELECTROCARDIOGRAM REPORT: CPT | Performed by: INTERNAL MEDICINE

## 2025-03-05 ENCOUNTER — NURSE TRIAGE (OUTPATIENT)
Age: 65
End: 2025-03-05

## 2025-03-05 NOTE — TELEPHONE ENCOUNTER
"FOLLOW UP: Call back # [phone],  required.     REASON FOR CONVERSATION: No Triage Call    SYMPTOMS: N/A    OTHER: Pt asking for letter for work, post op 3/3/25. Pt has someone to  letter, please call to let know when ready.     DISPOSITION: Discuss with Provider and Call Back Patient  Answer Assessment - Initial Assessment Questions  1. REASON FOR CALL: \"What is the main reason for your call?\" or \"How can I best help you?\"      Can I have a letter for work   2. SYMPTOMS : \"Do you have any symptoms?\"       none  3. OTHER QUESTIONS: \"Do you have any other questions?\"      none    Protocols used: Information Only Call - No Triage-Adult-[place]    "

## 2025-03-07 DIAGNOSIS — K40.90 LEFT INGUINAL HERNIA: ICD-10-CM

## 2025-03-07 RX ORDER — OXYCODONE HYDROCHLORIDE 5 MG/1
5 TABLET ORAL EVERY 6 HOURS PRN
Qty: 12 TABLET | Refills: 0 | Status: CANCELLED | OUTPATIENT
Start: 2025-03-07 | End: 2025-03-17

## 2025-03-07 NOTE — TELEPHONE ENCOUNTER
Patient advised this medication is not refilled.  He should take Tylenol and ibuprofen every 6 hrs.

## 2025-03-07 NOTE — TELEPHONE ENCOUNTER
Reason for call:   [x] Refill   [] Prior Auth  [] Other:     Office:   [] PCP/Provider -   [x] Specialty/Provider - Naldo Lott MD     Medication: OXYCODONE     Dose/Frequency: Take 1 tablet (5 mg total) by mouth every 6 (six) hours as needed     Quantity: 12 TABLET    Pharmacy:   [facility] [place], [place] -        Local Pharmacy   Does the patient have enough for 3 days?   [] Yes   [x] No - Send as HP to POD    Mail Away Pharmacy   Does the patient have enough for 10 days?   [] Yes   [] No - Send as HP to POD

## 2025-03-10 ENCOUNTER — RA CDI HCC (OUTPATIENT)
Dept: OTHER | Facility: HOSPITAL | Age: 65
End: 2025-03-10

## 2025-03-14 NOTE — PROGRESS NOTES
Assessment/Plan:   Rianna Vela is a 64 y.o.male who comes in today for postoperative check after a laparoscopic left inguinal hernia repair with mesh on 3/3/25 with Dr. Lott.    Patient is pleased with surgical results. Discussed testicular pain is not abnormal and could be inflammation from the surgery and with working down in lower pelvis. Should subside in few weeks. Will call if has any concerns.    Pathology: None sent    Postoperative restrictions reviewed, including specific lifting and exercise restrictions. Provided work note. All questions answered.     ___________________________________________________  HPI:  Rianna Vela is a 64 y.o.male who comes in today for postoperative check after recent laparoscopic left inguinal hernia repair with mesh on 3/3/25 with Dr. Lott.    Currently doing well without problems, no fever or chills,no nausea and no vomiting. Denies chest pain and troubles breathing. Reports some testicular discomfort.     ROS:  General ROS: negative for - chills, fatigue, fever or night sweats, weight loss  Respiratory ROS: no cough, shortness of breath, or wheezing  Cardiovascular ROS: no chest pain or dyspnea on exertion  Genito-Urinary ROS: no dysuria, trouble voiding, or hematuria  Musculoskeletal ROS: negative for - gait disturbance, joint pain or muscle pain  Neurological ROS: no TIA or stroke symptoms    GI ROS: see HPI  Skin ROS: no new rashes or lesions   Lymphatic ROS: no new adenopathy noted by pt.   GYN ROS: see HPI, no new GYN history or bleeding noted  Psy ROS: no new mental or behavioral disturbances     Patient Active Problem List   Diagnosis    Essential hypertension    Mixed hyperlipidemia    Non-recurrent unilateral inguinal hernia without obstruction or gangrene    Stage 3a chronic kidney disease (HCC)    Erectile dysfunction    Kidney stones    Seasonal allergic rhinitis due to pollen    Pre-diabetes    Osteopenia of multiple sites    Elevated PSA    Painful  urination         Allergies:   Patient has no known allergies.      Current Outpatient Medications:     atorvastatin (LIPITOR) 40 mg tablet, Take 1 tablet (40 mg total) by mouth daily, Disp: 90 tablet, Rfl: 3    linaCLOtide 145 MCG CAPS, Take 1 capsule (145 mcg total) by mouth daily at least 30 minutes prior to the first meal of the day, Disp: 30 capsule, Rfl: 5    lisinopril (ZESTRIL) 20 mg tablet, TAKE 1 TABLET (20 MG TOTAL) BY MOUTH DAILY, Disp: 90 tablet, Rfl: 1    tamsulosin (FLOMAX) 0.4 mg, Take 2 capsules (0.8 mg total) by mouth daily with dinner, Disp: 100 capsule, Rfl: 3    Past Medical History:   Diagnosis Date    Chronic kidney disease     Diverticulosis     ED (erectile dysfunction)     Enlarged prostate     Hypertension     Kidney stone     Multiple renal cysts     Obesity     Osteopenia     Pre-diabetes        Past Surgical History:   Procedure Laterality Date    COLONOSCOPY  04/2022    diverticulosis    HERNIA REPAIR Right 1993    inguinal R side repair in DR [place] LAPAROSCOPY SURG RPR INITIAL INGUINAL HERNIA Left 3/3/2025    Procedure: REPAIR HERNIA INGUINAL, LAP;  Surgeon: Naldo Lott MD;  Location: [facility];  Service: General       Family History   Problem Relation Age of Onset    Hypertension Mother     Hypertension Father         reports that he has never smoked. He has never used smokeless tobacco. He reports that he does not currently use alcohol. He reports that he does not currently use drugs.    There were no vitals filed for this visit.     PHYSICAL EXAM  General: normal, cooperative, no distress  Abdominal: soft, nondistended, or nontender  Incision: clean, dry, and intact and healing well    RICHARD Harris    Date: 3/14/2025 Time: 9:34 AM

## 2025-03-17 ENCOUNTER — APPOINTMENT (OUTPATIENT)
Dept: LAB | Facility: HOSPITAL | Age: 65
End: 2025-03-17
Payer: COMMERCIAL

## 2025-03-17 ENCOUNTER — OFFICE VISIT (OUTPATIENT)
Dept: FAMILY MEDICINE CLINIC | Facility: CLINIC | Age: 65
End: 2025-03-17
Payer: COMMERCIAL

## 2025-03-17 VITALS
HEART RATE: 86 BPM | BODY MASS INDEX: 30.53 KG/M2 | HEIGHT: 66 IN | RESPIRATION RATE: 16 BRPM | TEMPERATURE: 97.8 F | SYSTOLIC BLOOD PRESSURE: 98 MMHG | OXYGEN SATURATION: 98 % | DIASTOLIC BLOOD PRESSURE: 70 MMHG | WEIGHT: 190 LBS

## 2025-03-17 DIAGNOSIS — I10 ESSENTIAL HYPERTENSION: ICD-10-CM

## 2025-03-17 DIAGNOSIS — N40.1 BPH WITH URINARY OBSTRUCTION: ICD-10-CM

## 2025-03-17 DIAGNOSIS — N18.32 STAGE 3B CHRONIC KIDNEY DISEASE (HCC): ICD-10-CM

## 2025-03-17 DIAGNOSIS — F51.01 PRIMARY INSOMNIA: ICD-10-CM

## 2025-03-17 DIAGNOSIS — Z98.890 STATUS POST SURGERY: ICD-10-CM

## 2025-03-17 DIAGNOSIS — N13.8 BPH WITH URINARY OBSTRUCTION: ICD-10-CM

## 2025-03-17 DIAGNOSIS — I10 ESSENTIAL HYPERTENSION: Primary | ICD-10-CM

## 2025-03-17 DIAGNOSIS — E78.2 MIXED HYPERLIPIDEMIA: ICD-10-CM

## 2025-03-17 PROBLEM — R73.03 PRE-DIABETES: Status: RESOLVED | Noted: 2022-02-25 | Resolved: 2025-03-17

## 2025-03-17 PROBLEM — R30.9 PAINFUL URINATION: Status: RESOLVED | Noted: 2025-01-16 | Resolved: 2025-03-17

## 2025-03-17 PROBLEM — K40.90 NON-RECURRENT UNILATERAL INGUINAL HERNIA WITHOUT OBSTRUCTION OR GANGRENE: Status: RESOLVED | Noted: 2021-01-27 | Resolved: 2025-03-17

## 2025-03-17 PROBLEM — J30.1 SEASONAL ALLERGIC RHINITIS DUE TO POLLEN: Status: RESOLVED | Noted: 2021-06-23 | Resolved: 2025-03-17

## 2025-03-17 LAB
ALBUMIN SERPL BCG-MCNC: 3.9 G/DL (ref 3.5–5)
ALP SERPL-CCNC: 83 U/L (ref 34–104)
ALT SERPL W P-5'-P-CCNC: 14 U/L (ref 7–52)
ANION GAP SERPL CALCULATED.3IONS-SCNC: 7 MMOL/L (ref 4–13)
AST SERPL W P-5'-P-CCNC: 12 U/L (ref 13–39)
BILIRUB SERPL-MCNC: 0.56 MG/DL (ref 0.2–1)
BUN SERPL-MCNC: 34 MG/DL (ref 5–25)
CALCIUM SERPL-MCNC: 9.1 MG/DL (ref 8.4–10.2)
CHLORIDE SERPL-SCNC: 105 MMOL/L (ref 96–108)
CO2 SERPL-SCNC: 27 MMOL/L (ref 21–32)
CREAT SERPL-MCNC: 1.81 MG/DL (ref 0.6–1.3)
CREAT UR-MCNC: 90.9 MG/DL
GFR SERPL CREATININE-BSD FRML MDRD: 39 ML/MIN/1.73SQ M
GLUCOSE SERPL-MCNC: 101 MG/DL (ref 65–140)
MICROALBUMIN UR-MCNC: <7 MG/L
POTASSIUM SERPL-SCNC: 4.4 MMOL/L (ref 3.5–5.3)
PROT SERPL-MCNC: 7.3 G/DL (ref 6.4–8.4)
SODIUM SERPL-SCNC: 139 MMOL/L (ref 135–147)

## 2025-03-17 PROCEDURE — 36415 COLL VENOUS BLD VENIPUNCTURE: CPT

## 2025-03-17 PROCEDURE — 82570 ASSAY OF URINE CREATININE: CPT

## 2025-03-17 PROCEDURE — 80053 COMPREHEN METABOLIC PANEL: CPT

## 2025-03-17 PROCEDURE — 99214 OFFICE O/P EST MOD 30 MIN: CPT | Performed by: FAMILY MEDICINE

## 2025-03-17 PROCEDURE — 82043 UR ALBUMIN QUANTITATIVE: CPT

## 2025-03-17 RX ORDER — HYDROXYZINE HYDROCHLORIDE 25 MG/1
25 TABLET, FILM COATED ORAL
Qty: 30 TABLET | Refills: 0 | Status: SHIPPED | OUTPATIENT
Start: 2025-03-17

## 2025-03-17 RX ORDER — TAMSULOSIN HYDROCHLORIDE 0.4 MG/1
0.8 CAPSULE ORAL
Qty: 100 CAPSULE | Refills: 3 | Status: SHIPPED | OUTPATIENT
Start: 2025-03-17

## 2025-03-17 RX ORDER — ATORVASTATIN CALCIUM 40 MG/1
40 TABLET, FILM COATED ORAL DAILY
Qty: 90 TABLET | Refills: 3 | Status: SHIPPED | OUTPATIENT
Start: 2025-03-17

## 2025-03-17 NOTE — PROGRESS NOTES
Name: Rianna Vela      : 1960      MRN: [id number]  Encounter Provider: Nelson Jones MD  Encounter Date: 3/17/2025   Encounter department: [facility]    Assessment & Plan  Essential hypertension  - Blood pressure low at 98/70  - Discontinue lisinopril due to hypotension  - Order CMP  - Follow up in 3 months      Orders:    Comprehensive metabolic panel; Future    Stage 3b chronic kidney disease (HCC)  Lab Results   Component Value Date    EGFR 46 2025    EGFR 40 10/07/2024    EGFR 44 2024    CREATININE 1.56 (H) 2025    CREATININE 1.75 (H) 10/07/2024    CREATININE 1.64 (H) 2024   2. Chronic Kidney Disease:  - GFR 46, Creatinine 1.56, showing progressive decline  - Referral to nephrology  - Order urine creatinine/albumin ratio  - Patient educated about NSAID avoidance    - Follow-up with Dr. Tyler scheduled for 3/18/2025    Orders:    Ambulatory referral to Nephrology; Future    Albumin / creatinine urine ratio; Future    Mixed hyperlipidemia    3. Mixed Hyperlipidemia:  - Continue atorvastatin 40mg daily  - Emphasized medication compliance      Orders:    atorvastatin (LIPITOR) 40 mg tablet; Take 1 tablet (40 mg total) by mouth daily    BPH with urinary obstruction  4. BPH with Urinary Obstruction:  - Currently well-controlled on tamsulosin 0.4mg, two capsules daily with dinner  - Discussed BPH symptoms with patient    Orders:    tamsulosin (FLOMAX) 0.4 mg; Take 2 capsules (0.8 mg total) by mouth daily with dinner    Primary insomnia    5. Primary Insomnia:  - New prescription for hydroxyzine 25mg PRN  - No refills provided  - Counseled on potential side effects including urinary retention and dry mouth      Orders:    hydrOXYzine HCL (ATARAX) 25 mg tablet; Take 1 tablet (25 mg total) by mouth daily at bedtime    Status post surgery  6. Post-operative Status:  - Recent left inguinal hernia repair (3/3/2025)  - Recovering well         "      Subjective       Follow-up Visit for Hypertension with Multiple Chronic Conditions    Subjective:  64-year-old male presents for hypertension follow-up. Patient reports experiencing dizziness over the past three days. Denies chest pain or shortness of breath. Recently underwent left inguinal hernia repair by Dr. Tyler on 03/03/2025 and reports recovering well but feels not ready to return to work. Has upcoming follow-up with surgeon on 03/18/2025. Patient reports significant insomnia, sleeping only 3-4 hours per day, and requests sleep medication. Current medications include tamsulosin for BPH, lisinopril 20mg for hypertension, and atorvastatin 40mg for hyperlipidemia. He has a history of CKD, Patient referred in the past to Nephrologist but never did appt.    No Known Allergies    Patient Active Problem List   Diagnosis    Essential hypertension    Mixed hyperlipidemia    Stage 3a chronic kidney disease (HCC)    Erectile dysfunction    Kidney stones    Osteopenia of multiple sites    Elevated PSA         Current Outpatient Medications:     atorvastatin (LIPITOR) 40 mg tablet, Take 1 tablet (40 mg total) by mouth daily, Disp: 90 tablet, Rfl: 3    hydrOXYzine HCL (ATARAX) 25 mg tablet, Take 1 tablet (25 mg total) by mouth daily at bedtime, Disp: 30 tablet, Rfl: 0    tamsulosin (FLOMAX) 0.4 mg, Take 2 capsules (0.8 mg total) by mouth daily with dinner, Disp: 100 capsule, Rfl: 3    BP 98/70 (BP Location: Left arm, Patient Position: Sitting, Cuff Size: Standard)   Pulse 86   Temp 97.8 °F (36.6 °C) (Tympanic)   Resp 16   Ht 5' 6\" (1.676 m)   Wt 86.2 kg (190 lb)   SpO2 98%   BMI 30.67 kg/m²     Objective:  PHYSICAL EXAMINATION:  Neck: Normal carotid pulses  Heart: Regular rhythm, no murmurs  Lungs: Clear to auscultation  Abdomen: Laparoscopic incisions from left hernia repair healing well, no signs of infection, non-tender  Extremities: No edema    VITAL SIGNS:  BP: 98/70    LABORATORY RESULTS:  03/2025: CBC " - WBCs, hemoglobin, and platelets normal  03/01/2025: Creatinine 1.56, BUN 26 GFR 46  09/2024: Creatinine 1.64, GFR 44  02/26/2022: GFR 52, Creatinine 1.44

## 2025-03-17 NOTE — PROGRESS NOTES
Name: Rianna Vela      : 1960      MRN: [id number]  Encounter Provider: Nelson Jones MD  Encounter Date: 3/17/2025   Encounter department: [facility]    History of Present Illness  The patient presents for a follow-up visit. He mentions issues related to anemia and blood pressure. He is currently taking lisinopril for blood pressure management. He also takes melatonin.    He reports experiencing muscle pain, particularly in the shoulders. He mentions a significant deterioration in his condition in December. Blood work was done in September, and he had a hernia at the same time. He also mentions having a hernia and some issues with his toe.    He has been experiencing spasms and mentions the possibility of needing laparoscopic surgery. He also reports taking tamsulosin.    MEDICATIONS  Current: lisinopril, melatonin, tamsulosin      Physical Exam  Heart sounds are normal.  Lungs are clear to auscultation.  Spine examination is normal.    Results         Assessment & Plan  1. Anemia.  The patient mentions issues related to anemia. Further evaluation and monitoring of blood levels are recommended.    2. Hypertension.  He is currently taking lisinopril for blood pressure management. Continue with the current medication and monitor blood pressure regularly.    3. Muscle pain.  He reports muscle pain, particularly in the shoulders. Recommend physical therapy and over-the-counter pain relief as needed. Follow up if symptoms persist.    4. Hernia.  The patient mentions having a hernia and some issues with his toe. He also reports the possibility of needing laparoscopic surgery. Referral to a specialist for further evaluation and management is recommended.    5. Spasms.  He reports experiencing spasms. Recommend muscle relaxants and follow up if symptoms persist.    Follow-up  The patient will follow up as needed for further evaluation and management of his  conditions.      .[email or web address]

## 2025-03-18 ENCOUNTER — OFFICE VISIT (OUTPATIENT)
Dept: SURGERY | Facility: CLINIC | Age: 65
End: 2025-03-18

## 2025-03-18 ENCOUNTER — RESULTS FOLLOW-UP (OUTPATIENT)
Dept: FAMILY MEDICINE CLINIC | Facility: CLINIC | Age: 65
End: 2025-03-18

## 2025-03-18 VITALS
DIASTOLIC BLOOD PRESSURE: 84 MMHG | HEIGHT: 66 IN | HEART RATE: 94 BPM | TEMPERATURE: 99 F | SYSTOLIC BLOOD PRESSURE: 120 MMHG | WEIGHT: 189 LBS | OXYGEN SATURATION: 96 % | BODY MASS INDEX: 30.37 KG/M2

## 2025-03-18 DIAGNOSIS — Z87.19 S/P LEFT INGUINAL HERNIA REPAIR: Primary | ICD-10-CM

## 2025-03-18 DIAGNOSIS — Z98.890 S/P LEFT INGUINAL HERNIA REPAIR: Primary | ICD-10-CM

## 2025-03-18 PROCEDURE — 99024 POSTOP FOLLOW-UP VISIT: CPT

## 2025-03-18 NOTE — LETTER
March 18, 2025     Patient: Rianna Vela  YOB: 1960  Date of Visit: 3/18/2025      To Whom it May Concern:    Rianna Vela is under my professional care. Rianna was seen in my office on 3/18/2025. Rianna may return to work on 03/31/2025 .    If you have any questions or concerns, please don't hesitate to call.         Sincerely,          RICHARD Harris        CC: No Recipients

## 2025-03-18 NOTE — RESULT ENCOUNTER NOTE
Please call the patient, his kidney function is worsening.  Please make an appointment with nephrologist.    Wei Farnsworth MD  Phone: [phone] [address] [facility]  [address]  [place] [address]

## 2025-04-07 ENCOUNTER — CONSULT (OUTPATIENT)
Dept: NEPHROLOGY | Facility: CLINIC | Age: 65
End: 2025-04-07
Payer: COMMERCIAL

## 2025-04-07 VITALS
BODY MASS INDEX: 30.99 KG/M2 | HEART RATE: 70 BPM | WEIGHT: 192 LBS | SYSTOLIC BLOOD PRESSURE: 120 MMHG | OXYGEN SATURATION: 98 % | DIASTOLIC BLOOD PRESSURE: 78 MMHG

## 2025-04-07 DIAGNOSIS — N20.0 NEPHROLITHIASIS: ICD-10-CM

## 2025-04-07 DIAGNOSIS — N18.32 STAGE 3B CHRONIC KIDNEY DISEASE (HCC): ICD-10-CM

## 2025-04-07 DIAGNOSIS — N18.9 CHRONIC KIDNEY DISEASE, UNSPECIFIED CKD STAGE: Primary | ICD-10-CM

## 2025-04-07 PROBLEM — N18.31 STAGE 3A CHRONIC KIDNEY DISEASE (HCC): Status: RESOLVED | Noted: 2021-01-27 | Resolved: 2025-04-07

## 2025-04-07 PROCEDURE — 99204 OFFICE O/P NEW MOD 45 MIN: CPT | Performed by: INTERNAL MEDICINE

## 2025-04-07 NOTE — PATIENT INSTRUCTIONS
You are here for your first visit to evaluate your kidney function.  Looking back a couple years the creatinine which is the blood test for the kidneys measures 1.5-1.7.  There is no protein in the urine which is a good thing because aggressive diseases have protein in the urine.  You do have multiple cysts in both kidneys so that might be affecting the function.  But overall there is no specific medication other than good routine health blood pressure control and we will monitor this every 6 months.

## 2025-04-07 NOTE — LETTER
2025     Nelson Jones MD  [address] [place] [id number]    Patient: Rianna Vela   YOB: 1960   Date of Visit: 2025       Dear Dr. Nelson Jones MD:    Thank you for referring Rianna Vela to me for evaluation. Below are my notes for this consultation.    If you have questions, please do not hesitate to call me. I look forward to following your patient along with you.         Sincerely,        Lopez Pascual MD        CC: No Recipients    Lopez Pascual MD  2025  9:18 AM  Sign when Signing Visit  Name: Rianna Vela      : 1960      MRN: [id number]  Encounter Provider: Lopez Pascual MD  Encounter Date: 2025   Encounter department: [facility] [place]  :  Assessment & Plan  Chronic kidney disease, unspecified CKD stage  Lab Results   Component Value Date    EGFR 39 2025    EGFR 46 2025    EGFR 40 10/07/2024    CREATININE 1.81 (H) 2025    CREATININE 1.56 (H) 2025    CREATININE 1.75 (H) 10/07/2024     The patient is chronic kidney disease and looks like his creatinines been fluctuating around 1.5-1.7 with his most recent being 1.8.  He had measurement and screening for albumin and it was negative so there is no significant proteinuria.  He states he really had not seen many doctors but had never heard that there is any problem with his kidneys.  He has seen urology and has no significant PVR.  Imaging did show kidneys that have cysts bilaterally.  There is no family history of polycystic kidney disease but there is multiple cysts in both kidneys so it is a remote possibility he could have ADPKD.  If that is the case he would have a good prognosis overall given his age and relatively preserved kidney function.  Regardless there is no proteinuria so he does not really require aggressive workup and at this point would just manage with making sure he maintains a good blood pressure and overall health  maintenance.    We looked at his CAT scan together and I pointed out that there are cysts in both kidneys.  His blood pressure is excellent.    Labs and follow-up in 6 months  I told to call if there is any problems or concerns before next visit.    Orders:  •  Basic metabolic panel; Future    Nephrolithiasis    When questioned the patient states he had a kidney stone event many years ago but on imaging it showed that he has bilateral stone disease and is 1 rather large stone of 8 mm in his right kidney.  He has no symptoms presently but at some point we may want to work this up.  Was beyond the scope of this visit but we will discuss it on follow-up.      I have spent a total time of 60 minutes in caring for this patient on the day of the visit/encounter including Diagnostic results, Patient and family education, Impressions, Reviewing/placing orders in the medical record (including tests, medications, and/or procedures), and Obtaining or reviewing history  .            History of Present Illness  HPI  Rianna Vela is a 64 y.o. male who presents for his first visit.  The patient was communicated with his I used the translation line so we could communicate as he was Portuguese-speaking only.  I took a thorough history patient has no complaints except some dysuria at times and he has told his urologist about that and has not had infections.  Other than that he felt well today with no acute complaints.  History obtained from: patient    Review of Systems   Constitutional:  Negative for appetite change, fatigue and fever.   HENT: Negative.     Eyes: Negative.    Respiratory:  Negative for cough, chest tightness and shortness of breath.    Cardiovascular:  Negative for chest pain and leg swelling.   Gastrointestinal:  Negative for abdominal pain, diarrhea, nausea and vomiting.   Genitourinary:  Negative for flank pain and hematuria.        At times he has some dysuria.   Neurological:  Negative for dizziness and headaches.    Psychiatric/Behavioral:  Negative for agitation, behavioral problems, confusion and decreased concentration.      Medical History Reviewed by provider this encounter:     .  Past Medical History  Past Medical History:   Diagnosis Date   • Chronic kidney disease    • Diverticulosis    • ED (erectile dysfunction)    • Enlarged prostate    • Hypertension    • Kidney stone    • Multiple renal cysts    • Obesity    • Osteopenia    • Pre-diabetes      Past Surgical History:   Procedure Laterality Date   • COLONOSCOPY  04/2022    diverticulosis   • HERNIA REPAIR Right 1993    inguinal R side repair in DR   • [place] LAPAROSCOPY SURG RPR INITIAL INGUINAL HERNIA Left 3/3/2025    Procedure: REPAIR HERNIA INGUINAL, LAP;  Surgeon: Naldo Lott MD;  Location: [facility] OR;  Service: General     Family History   Problem Relation Age of Onset   • Hypertension Mother    • Hypertension Father    No family history of kidney disease.   reports that he has never smoked. He has never used smokeless tobacco. He reports that he does not currently use alcohol. He reports that he does not currently use drugs.  Current Outpatient Medications   Medication Instructions   • atorvastatin (LIPITOR) 40 mg, Oral, Daily   • hydrOXYzine HCL (ATARAX) 25 mg, Oral, Daily at bedtime   • tamsulosin (FLOMAX) 0.8 mg, Oral, Daily with dinner   No Known Allergies   Current Outpatient Medications on File Prior to Visit   Medication Sig Dispense Refill   • hydrOXYzine HCL (ATARAX) 25 mg tablet Take 1 tablet (25 mg total) by mouth daily at bedtime 30 tablet 0   • tamsulosin (FLOMAX) 0.4 mg Take 2 capsules (0.8 mg total) by mouth daily with dinner 100 capsule 3   • atorvastatin (LIPITOR) 40 mg tablet Take 1 tablet (40 mg total) by mouth daily (Patient not taking: Reported on 4/7/2025) 90 tablet 3     No current facility-administered medications on file prior to visit.      Social History     Tobacco Use   • Smoking status: Never   • Smokeless tobacco: Never    Vaping Use   • Vaping status: Never Used   Substance and Sexual Activity   • Alcohol use: Not Currently   • Drug use: Not Currently   • Sexual activity: Not Currently     Partners: Female     Birth control/protection: None   No tobacco ethanol or drug abuse.     Objective  /78 (BP Location: Left arm, Patient Position: Sitting, Cuff Size: Standard)   Pulse 70   Wt 87.1 kg (192 lb)   SpO2 98%   BMI 30.99 kg/m²      Physical Exam  Constitutional:       General: He is not in acute distress.     Appearance: Normal appearance. He is not ill-appearing or toxic-appearing.   HENT:      Head: Normocephalic and atraumatic.      Nose: Nose normal.      Mouth/Throat:      Mouth: Mucous membranes are moist.   Eyes:      Extraocular Movements: Extraocular movements intact.   Cardiovascular:      Rate and Rhythm: Normal rate and regular rhythm.      Heart sounds:      No gallop.      Comments: No edema.  Pulmonary:      Effort: Pulmonary effort is normal. No respiratory distress.      Breath sounds: No wheezing or rales.   Abdominal:      General: Bowel sounds are normal. There is no distension.      Palpations: Abdomen is soft.      Tenderness: There is no abdominal tenderness.   Neurological:      Mental Status: He is alert and oriented to person, place, and time.

## 2025-04-07 NOTE — ASSESSMENT & PLAN NOTE
Lab Results   Component Value Date    EGFR 39 03/17/2025    EGFR 46 03/01/2025    EGFR 40 10/07/2024    CREATININE 1.81 (H) 03/17/2025    CREATININE 1.56 (H) 03/01/2025    CREATININE 1.75 (H) 10/07/2024     The patient is chronic kidney disease and looks like his creatinines been fluctuating around 1.5-1.7 with his most recent being 1.8.  He had measurement and screening for albumin and it was negative so there is no significant proteinuria.  He states he really had not seen many doctors but had never heard that there is any problem with his kidneys.  He has seen urology and has no significant PVR.  Imaging did show kidneys that have cysts bilaterally.  There is no family history of polycystic kidney disease but there is multiple cysts in both kidneys so it is a remote possibility he could have ADPKD.  If that is the case he would have a good prognosis overall given his age and relatively preserved kidney function.  Regardless there is no proteinuria so he does not really require aggressive workup and at this point would just manage with making sure he maintains a good blood pressure and overall health maintenance.    We looked at his CAT scan together and I pointed out that there are cysts in both kidneys.  His blood pressure is excellent.    Labs and follow-up in 6 months  I told to call if there is any problems or concerns before next visit.    Orders:    Basic metabolic panel; Future

## 2025-04-07 NOTE — PROGRESS NOTES
Name: iRanna Vela      : 1960      MRN: [id number]  Encounter Provider: Lopez Pascual MD  Encounter Date: 2025   Encounter department: [facility]  :  Assessment & Plan  Chronic kidney disease, unspecified CKD stage  Lab Results   Component Value Date    EGFR 39 2025    EGFR 46 2025    EGFR 40 10/07/2024    CREATININE 1.81 (H) 2025    CREATININE 1.56 (H) 2025    CREATININE 1.75 (H) 10/07/2024     The patient is chronic kidney disease and looks like his creatinines been fluctuating around 1.5-1.7 with his most recent being 1.8.  He had measurement and screening for albumin and it was negative so there is no significant proteinuria.  He states he really had not seen many doctors but had never heard that there is any problem with his kidneys.  He has seen urology and has no significant PVR.  Imaging did show kidneys that have cysts bilaterally.  There is no family history of polycystic kidney disease but there is multiple cysts in both kidneys so it is a remote possibility he could have ADPKD.  If that is the case he would have a good prognosis overall given his age and relatively preserved kidney function.  Regardless there is no proteinuria so he does not really require aggressive workup and at this point would just manage with making sure he maintains a good blood pressure and overall health maintenance.    We looked at his CAT scan together and I pointed out that there are cysts in both kidneys.  His blood pressure is excellent.    Labs and follow-up in 6 months  I told to call if there is any problems or concerns before next visit.    Orders:    Basic metabolic panel; Future    Nephrolithiasis    When questioned the patient states he had a kidney stone event many years ago but on imaging it showed that he has bilateral stone disease and is 1 rather large stone of 8 mm in his right kidney.  He has no symptoms presently but at some point we may  want to work this up.  Was beyond the scope of this visit but we will discuss it on follow-up.      I have spent a total time of 60 minutes in caring for this patient on the day of the visit/encounter including Diagnostic results, Patient and family education, Impressions, Reviewing/placing orders in the medical record (including tests, medications, and/or procedures), and Obtaining or reviewing history  .            History of Present Illness   HPI  Rianna Vela is a 64 y.o. male who presents for his first visit.  The patient was communicated with his I used the translation line so we could communicate as he was Turkmen-speaking only.  I took a thorough history patient has no complaints except some dysuria at times and he has told his urologist about that and has not had infections.  Other than that he felt well today with no acute complaints.  History obtained from: patient    Review of Systems   Constitutional:  Negative for appetite change, fatigue and fever.   HENT: Negative.     Eyes: Negative.    Respiratory:  Negative for cough, chest tightness and shortness of breath.    Cardiovascular:  Negative for chest pain and leg swelling.   Gastrointestinal:  Negative for abdominal pain, diarrhea, nausea and vomiting.   Genitourinary:  Negative for flank pain and hematuria.        At times he has some dysuria.   Neurological:  Negative for dizziness and headaches.   Psychiatric/Behavioral:  Negative for agitation, behavioral problems, confusion and decreased concentration.      Medical History Reviewed by provider this encounter:     .  Past Medical History   Past Medical History:   Diagnosis Date    Chronic kidney disease     Diverticulosis     ED (erectile dysfunction)     Enlarged prostate     Hypertension     Kidney stone     Multiple renal cysts     Obesity     Osteopenia     Pre-diabetes      Past Surgical History:   Procedure Laterality Date    COLONOSCOPY  04/2022    diverticulosis    HERNIA REPAIR Right 1993     inguinal R side repair in DR WEEKS LAPAROSCOPY SURG RPR INITIAL INGUINAL HERNIA Left 3/3/2025    Procedure: REPAIR HERNIA INGUINAL, LAP;  Surgeon: Naldo Lott MD;  Location: [facility];  Service: General     Family History   Problem Relation Age of Onset    Hypertension Mother     Hypertension Father    No family history of kidney disease.   reports that he has never smoked. He has never used smokeless tobacco. He reports that he does not currently use alcohol. He reports that he does not currently use drugs.  Current Outpatient Medications   Medication Instructions    atorvastatin (LIPITOR) 40 mg, Oral, Daily    hydrOXYzine HCL (ATARAX) 25 mg, Oral, Daily at bedtime    tamsulosin (FLOMAX) 0.8 mg, Oral, Daily with dinner   No Known Allergies   Current Outpatient Medications on File Prior to Visit   Medication Sig Dispense Refill    hydrOXYzine HCL (ATARAX) 25 mg tablet Take 1 tablet (25 mg total) by mouth daily at bedtime 30 tablet 0    tamsulosin (FLOMAX) 0.4 mg Take 2 capsules (0.8 mg total) by mouth daily with dinner 100 capsule 3    atorvastatin (LIPITOR) 40 mg tablet Take 1 tablet (40 mg total) by mouth daily (Patient not taking: Reported on 4/7/2025) 90 tablet 3     No current facility-administered medications on file prior to visit.      Social History     Tobacco Use    Smoking status: Never    Smokeless tobacco: Never   Vaping Use    Vaping status: Never Used   Substance and Sexual Activity    Alcohol use: Not Currently    Drug use: Not Currently    Sexual activity: Not Currently     Partners: Female     Birth control/protection: None   No tobacco ethanol or drug abuse.     Objective   /78 (BP Location: Left arm, Patient Position: Sitting, Cuff Size: Standard)   Pulse 70   Wt 87.1 kg (192 lb)   SpO2 98%   BMI 30.99 kg/m²      Physical Exam  Constitutional:       General: He is not in acute distress.     Appearance: Normal appearance. He is not ill-appearing or toxic-appearing.   HENT:       Head: Normocephalic and atraumatic.      Nose: Nose normal.      Mouth/Throat:      Mouth: Mucous membranes are moist.   Eyes:      Extraocular Movements: Extraocular movements intact.   Cardiovascular:      Rate and Rhythm: Normal rate and regular rhythm.      Heart sounds:      No gallop.      Comments: No edema.  Pulmonary:      Effort: Pulmonary effort is normal. No respiratory distress.      Breath sounds: No wheezing or rales.   Abdominal:      General: Bowel sounds are normal. There is no distension.      Palpations: Abdomen is soft.      Tenderness: There is no abdominal tenderness.   Neurological:      Mental Status: He is alert and oriented to person, place, and time.

## 2025-06-17 ENCOUNTER — OFFICE VISIT (OUTPATIENT)
Dept: FAMILY MEDICINE CLINIC | Facility: CLINIC | Age: 65
End: 2025-06-17
Payer: COMMERCIAL

## 2025-06-17 VITALS
SYSTOLIC BLOOD PRESSURE: 110 MMHG | DIASTOLIC BLOOD PRESSURE: 70 MMHG | WEIGHT: 195 LBS | HEART RATE: 72 BPM | RESPIRATION RATE: 16 BRPM | HEIGHT: 66 IN | OXYGEN SATURATION: 98 % | BODY MASS INDEX: 31.34 KG/M2 | TEMPERATURE: 97.9 F

## 2025-06-17 DIAGNOSIS — N40.1 BPH WITH URINARY OBSTRUCTION: Primary | ICD-10-CM

## 2025-06-17 DIAGNOSIS — I10 ESSENTIAL HYPERTENSION: ICD-10-CM

## 2025-06-17 DIAGNOSIS — N13.8 BPH WITH URINARY OBSTRUCTION: Primary | ICD-10-CM

## 2025-06-17 PROCEDURE — 99214 OFFICE O/P EST MOD 30 MIN: CPT | Performed by: FAMILY MEDICINE

## 2025-06-17 RX ORDER — TAMSULOSIN HYDROCHLORIDE 0.4 MG/1
0.4 CAPSULE ORAL
Qty: 30 CAPSULE | Refills: 5 | Status: SHIPPED | OUTPATIENT
Start: 2025-06-17

## 2025-06-17 RX ORDER — TADALAFIL 5 MG/1
5 TABLET ORAL DAILY PRN
Qty: 30 TABLET | Refills: 5 | Status: SHIPPED | OUTPATIENT
Start: 2025-06-17

## 2025-06-17 NOTE — PROGRESS NOTES
"  Name: Rianna Vela      : 1960      MRN: [id number]  Encounter Provider: Nelson Jones MD  Encounter Date: 2025   Encounter department: [facility]    Assessment & Plan  BPH with urinary obstruction  1. Benign Prostatic Hyperplasia (BPH)  - Patient experiencing urinary retention and retrograde ejaculation as side effects from tamsulosin  - Switching from tamsulosin to tadalafil (Cialis) 5mg daily to address both BPH symptoms and ejaculatory dysfunction  - Alternative plan discussed: may consider combination therapy with tadalafil and tamsulosin for enhanced effect if monotherapy insufficient  - Surgical intervention (prostatectomy) discussed as last resort due to risk of permanent erectile dysfunction  - Follow-up recommended to assess response to medication change  Orders:    tadalafil (CIALIS) 5 MG tablet; Take 1 tablet (5 mg total) by mouth daily as needed for erectile dysfunction    tamsulosin (FLOMAX) 0.4 mg; Take 1 capsule (0.4 mg total) by mouth daily with dinner    Essential hypertension  Blood pressure is well control, patient will continue same treatment.  Patient understands the risks associated with HTN and the need for adequate control and adherence to therapy.  Keep low salt in diet.  Keep weight down  Avoid alcohol consumption.     Hypertension  - Well-controlled, /70 today  - No antihypertensive medications currently needed    . Hypercholesterolemia  - Currently on daily cholesterol medication (specific agent not specified)  - Reported to be under good control                    Subjective:  Patient is an elderly [place]ican-speaking male presenting for follow-up of multiple chronic conditions. His primary concern today is urinary symptoms related to benign prostatic hyperplasia. He reports difficulty with urination, specifically a sensation of incomplete emptying and urinary retention. Patient states, \"I feel like I've gone, but nothing " "comes out, \" followed by burning sensations. He also reports retrograde ejaculation as a side effect of his current medication (tamsulosin), describing that he no longer ejaculates normally. Patient denies any current urinary tract infections, noting that all his previous analyses have been negative for infection. He continues to be sexually active and is concerned about the impact of his medication on sexual function. Patient reports compliance with his cholesterol medication and confirms he is not currently taking any blood pressure medications as previously instructed.    Objective:  Vital Signs:  - /70 (BP Location: Left arm, Patient Position: Sitting, Cuff Size: Standard)   Pulse 72   Temp 97.9 °F (36.6 °C) (Tympanic)   Resp 16   Ht 5' 6\" (1.676 m)   Wt 88.5 kg (195 lb)   SpO2 98%   BMI 31.47 kg/m²       Medications:  - Tamsulosin (recently discontinued)  - Cholesterol medication (specific agent not documented)    Physical Examination:  - General: Elderly male in no acute distress  - Prostate: Described as enlarged (\"floridalma un trinquete\")    Laboratory/Diagnostic Results:  - Previous urinalyses reportedly negative for infection    Additional Notes:  Note: This encounter was conducted primarily in Vietnamese. The patient described prostatic symptoms including urinary retention and retrograde ejaculation as side effects from tamsulosin. A medication change to tadalafil was recommended with discussion of potential surgical intervention if symptoms persist.          "

## 2025-06-17 NOTE — ASSESSMENT & PLAN NOTE
Blood pressure is well control, patient will continue same treatment.  Patient understands the risks associated with HTN and the need for adequate control and adherence to therapy.  Keep low salt in diet.  Keep weight down  Avoid alcohol consumption.     Hypertension  - Well-controlled, /70 today  - No antihypertensive medications currently needed    . Hypercholesterolemia  - Currently on daily cholesterol medication (specific agent not specified)  - Reported to be under good control

## 2025-06-18 ENCOUNTER — TELEPHONE (OUTPATIENT)
Age: 65
End: 2025-06-18

## 2025-06-18 NOTE — TELEPHONE ENCOUNTER
Please have provider sign office visit note from 6/17 before a prior authorization can be submitted for Tadalafil.     Thank you

## 2025-06-23 NOTE — TELEPHONE ENCOUNTER
PA for tadalafil 5MG tablet APPROVED     Date(s) approved 05/24/2025-06/23/2026    Case #[id number]     Patient advised by          []MyChart Message  []Phone call   []LMOM  []L/M to call office as no active Communication consent on file  [x]Unable to leave detailed message as VM not approved on Communication consent       Pharmacy advised by    [x]Fax  []Phone call     Approval letter scanned into Media No waiting for letter

## 2025-06-23 NOTE — TELEPHONE ENCOUNTER
PA for tadalafil 5MG tablet SUBMITTED to [employer]    via    []CMM-KEY:   [x][employer]-Case ID # [id number]   []Availity-Auth ID # NDC #   []Faxed to plan   []Other website   []Phone call Case ID #     [x]PA sent as URGENT    All office notes, labs and other pertaining documents and studies sent. Clinical questions answered. Awaiting determination from insurance company.     Turnaround time for your insurance to make a decision on your Prior Authorization can take 7-21 business days.

## (undated) DEVICE — DRAPE EQUIPMENT RF WAND

## (undated) DEVICE — CHLORAPREP HI-LITE 26ML ORANGE

## (undated) DEVICE — SUT SILK 2-0 SH 30 IN K833H

## (undated) DEVICE — INTENDED FOR TISSUE SEPARATION, AND OTHER PROCEDURES THAT REQUIRE A SHARP SURGICAL BLADE TO PUNCTURE OR CUT.: Brand: BARD-PARKER SAFETY BLADES SIZE 11, STERILE

## (undated) DEVICE — 2000CC GUARDIAN II: Brand: GUARDIAN

## (undated) DEVICE — ALLENTOWN LAP CHOLE APP PACK: Brand: CARDINAL HEALTH

## (undated) DEVICE — TROCAR: Brand: KII SLEEVE

## (undated) DEVICE — REM POLYHESIVE ADULT PATIENT RETURN ELECTRODE: Brand: VALLEYLAB

## (undated) DEVICE — ACCESS AND DISSECTOR SYSTEM: Brand: SPACEMAKER

## (undated) DEVICE — TROCARS: Brand: KII® BALLOON BLUNT TIP SYSTEM

## (undated) DEVICE — SUT VICRYL 0 UR-6 27 IN J603H

## (undated) DEVICE — EXOFIN PRECISION PEN HIGH VISCOSITY TOPICAL SKIN ADHESIVE: Brand: EXOFIN PRECISION PEN, 1G

## (undated) DEVICE — SCD SEQUENTIAL COMPRESSION COMFORT SLEEVE MEDIUM KNEE LENGTH: Brand: KENDALL SCD

## (undated) DEVICE — PENCILETTE PUSH BUTTON COATED

## (undated) DEVICE — [HIGH FLOW INSUFFLATOR,  DO NOT USE IF PACKAGE IS DAMAGED,  KEEP DRY,  KEEP AWAY FROM SUNLIGHT,  PROTECT FROM HEAT AND RADIOACTIVE SOURCES.]: Brand: PNEUMOSURE

## (undated) DEVICE — 5 MM CURVED DISSECTORS WITH MONOPOLAR CAUTERY: Brand: ENDOPATH

## (undated) DEVICE — CLIP APPLIER WITH CLIP LOGIC TECHNOLOGY: Brand: ENDO CLIP III

## (undated) DEVICE — BLUE HEAT SCOPE WARMER

## (undated) DEVICE — SUT MONOCRYL 4-0 PS-2 27 IN Y426H

## (undated) DEVICE — GLOVE SRG BIOGEL ECLIPSE 7.5

## (undated) DEVICE — GLOVE INDICATOR PI UNDERGLOVE SZ 8 BLUE

## (undated) DEVICE — DISPOSABLE OR TOWEL: Brand: CARDINAL HEALTH